# Patient Record
Sex: FEMALE | Race: BLACK OR AFRICAN AMERICAN | NOT HISPANIC OR LATINO | URBAN - METROPOLITAN AREA
[De-identification: names, ages, dates, MRNs, and addresses within clinical notes are randomized per-mention and may not be internally consistent; named-entity substitution may affect disease eponyms.]

---

## 2018-09-05 PROBLEM — Z00.00 ENCOUNTER FOR PREVENTIVE HEALTH EXAMINATION: Status: ACTIVE | Noted: 2018-09-05

## 2018-09-20 ENCOUNTER — OUTPATIENT (OUTPATIENT)
Dept: OUTPATIENT SERVICES | Facility: HOSPITAL | Age: 33
LOS: 1 days | End: 2018-09-20
Payer: COMMERCIAL

## 2018-09-20 ENCOUNTER — APPOINTMENT (OUTPATIENT)
Dept: RADIOLOGY | Facility: HOSPITAL | Age: 33
End: 2018-09-20
Payer: MEDICAID

## 2018-09-20 DIAGNOSIS — Z00.8 ENCOUNTER FOR OTHER GENERAL EXAMINATION: ICD-10-CM

## 2018-09-20 DIAGNOSIS — H53.8 OTHER VISUAL DISTURBANCES: ICD-10-CM

## 2018-09-20 LAB
APPEARANCE CSF: CLEAR — SIGNIFICANT CHANGE UP
APPEARANCE CSF: CLEAR — SIGNIFICANT CHANGE UP
APPEARANCE SPUN FLD: COLORLESS — SIGNIFICANT CHANGE UP
APPEARANCE SPUN FLD: COLORLESS — SIGNIFICANT CHANGE UP
COLOR CSF: SIGNIFICANT CHANGE UP
COLOR CSF: SIGNIFICANT CHANGE UP
GLUCOSE CSF-MCNC: 62 MG/DL — SIGNIFICANT CHANGE UP (ref 40–70)
GRAM STN FLD: SIGNIFICANT CHANGE UP
LYMPHOCYTES # CSF: 58 % — SIGNIFICANT CHANGE UP (ref 40–80)
LYMPHOCYTES # CSF: 83 % — HIGH (ref 40–80)
MONOS+MACROS NFR CSF: 17 % — SIGNIFICANT CHANGE UP (ref 15–45)
MONOS+MACROS NFR CSF: 35 % — SIGNIFICANT CHANGE UP (ref 15–45)
NEUTROPHILS # CSF: 0 % — SIGNIFICANT CHANGE UP (ref 0–6)
NEUTROPHILS # CSF: 7 % — HIGH (ref 0–6)
NRBC NFR CSF: 2 /UL — SIGNIFICANT CHANGE UP (ref 0–5)
NRBC NFR CSF: 3 /UL — SIGNIFICANT CHANGE UP (ref 0–5)
PROT CSF-MCNC: 19 MG/DL — SIGNIFICANT CHANGE UP (ref 15–45)
RBC # CSF: 310 /UL — HIGH (ref 0–0)
RBC # CSF: 39 /UL — HIGH (ref 0–0)
SPECIMEN SOURCE: SIGNIFICANT CHANGE UP
TUBE TYPE: SIGNIFICANT CHANGE UP
TUBE TYPE: SIGNIFICANT CHANGE UP

## 2018-09-20 PROCEDURE — 62270 DX LMBR SPI PNXR: CPT

## 2018-09-20 PROCEDURE — 84157 ASSAY OF PROTEIN OTHER: CPT

## 2018-09-20 PROCEDURE — 77003 FLUOROGUIDE FOR SPINE INJECT: CPT

## 2018-09-20 PROCEDURE — 82945 GLUCOSE OTHER FLUID: CPT

## 2018-09-20 PROCEDURE — 87070 CULTURE OTHR SPECIMN AEROBIC: CPT

## 2018-09-20 PROCEDURE — 87205 SMEAR GRAM STAIN: CPT

## 2018-09-20 PROCEDURE — 89051 BODY FLUID CELL COUNT: CPT

## 2018-09-20 PROCEDURE — 77003 FLUOROGUIDE FOR SPINE INJECT: CPT | Mod: 26

## 2018-09-23 LAB
CULTURE RESULTS: SIGNIFICANT CHANGE UP
SPECIMEN SOURCE: SIGNIFICANT CHANGE UP

## 2018-11-14 ENCOUNTER — APPOINTMENT (OUTPATIENT)
Dept: RADIOLOGY | Facility: HOSPITAL | Age: 33
End: 2018-11-14
Payer: MEDICAID

## 2018-11-14 ENCOUNTER — OUTPATIENT (OUTPATIENT)
Dept: OUTPATIENT SERVICES | Facility: HOSPITAL | Age: 33
LOS: 1 days | End: 2018-11-14
Payer: COMMERCIAL

## 2018-11-14 DIAGNOSIS — G93.2 BENIGN INTRACRANIAL HYPERTENSION: ICD-10-CM

## 2018-11-14 DIAGNOSIS — H05.10 UNSPECIFIED CHRONIC INFLAMMATORY DISORDERS OF ORBIT: ICD-10-CM

## 2018-11-14 DIAGNOSIS — Z00.8 ENCOUNTER FOR OTHER GENERAL EXAMINATION: ICD-10-CM

## 2018-11-14 PROCEDURE — 77003 FLUOROGUIDE FOR SPINE INJECT: CPT

## 2018-11-14 PROCEDURE — 77003 FLUOROGUIDE FOR SPINE INJECT: CPT | Mod: 26

## 2018-11-14 PROCEDURE — 89051 BODY FLUID CELL COUNT: CPT

## 2018-11-14 PROCEDURE — 87070 CULTURE OTHR SPECIMN AEROBIC: CPT

## 2018-11-14 PROCEDURE — 87205 SMEAR GRAM STAIN: CPT

## 2018-11-14 PROCEDURE — 62270 DX LMBR SPI PNXR: CPT

## 2018-11-14 PROCEDURE — 84157 ASSAY OF PROTEIN OTHER: CPT

## 2018-11-14 PROCEDURE — 82945 GLUCOSE OTHER FLUID: CPT

## 2018-11-26 ENCOUNTER — APPOINTMENT (OUTPATIENT)
Dept: SPINE | Facility: CLINIC | Age: 33
End: 2018-11-26
Payer: MEDICAID

## 2018-11-26 VITALS
HEIGHT: 61 IN | HEART RATE: 74 BPM | DIASTOLIC BLOOD PRESSURE: 77 MMHG | WEIGHT: 259 LBS | BODY MASS INDEX: 48.9 KG/M2 | SYSTOLIC BLOOD PRESSURE: 119 MMHG

## 2018-11-26 PROCEDURE — 99204 OFFICE O/P NEW MOD 45 MIN: CPT

## 2018-11-29 ENCOUNTER — APPOINTMENT (OUTPATIENT)
Dept: SPINE | Facility: CLINIC | Age: 33
End: 2018-11-29
Payer: MEDICAID

## 2018-11-29 VITALS
HEIGHT: 61 IN | DIASTOLIC BLOOD PRESSURE: 74 MMHG | BODY MASS INDEX: 48.9 KG/M2 | WEIGHT: 259 LBS | SYSTOLIC BLOOD PRESSURE: 113 MMHG | HEART RATE: 79 BPM

## 2018-11-29 PROCEDURE — 99214 OFFICE O/P EST MOD 30 MIN: CPT

## 2018-11-29 RX ORDER — ACETAMINOPHEN 500 MG/1
500 TABLET, COATED ORAL
Refills: 0 | Status: COMPLETED | COMMUNITY
End: 2018-11-29

## 2018-12-13 ENCOUNTER — APPOINTMENT (OUTPATIENT)
Dept: NEUROSURGERY | Facility: CLINIC | Age: 33
End: 2018-12-13
Payer: MEDICAID

## 2018-12-13 VITALS
WEIGHT: 258 LBS | DIASTOLIC BLOOD PRESSURE: 85 MMHG | BODY MASS INDEX: 48.71 KG/M2 | OXYGEN SATURATION: 99 % | HEIGHT: 61 IN | TEMPERATURE: 98.8 F | SYSTOLIC BLOOD PRESSURE: 123 MMHG | HEART RATE: 72 BPM | RESPIRATION RATE: 17 BRPM

## 2018-12-13 PROCEDURE — 99215 OFFICE O/P EST HI 40 MIN: CPT

## 2019-01-07 ENCOUNTER — OTHER (OUTPATIENT)
Age: 34
End: 2019-01-07

## 2019-01-14 ENCOUNTER — APPOINTMENT (OUTPATIENT)
Dept: SPINE | Facility: CLINIC | Age: 34
End: 2019-01-14

## 2019-01-30 ENCOUNTER — OUTPATIENT (OUTPATIENT)
Dept: OUTPATIENT SERVICES | Facility: HOSPITAL | Age: 34
LOS: 1 days | End: 2019-01-30
Payer: COMMERCIAL

## 2019-01-30 VITALS
SYSTOLIC BLOOD PRESSURE: 122 MMHG | HEIGHT: 61 IN | HEART RATE: 88 BPM | RESPIRATION RATE: 18 BRPM | DIASTOLIC BLOOD PRESSURE: 84 MMHG | OXYGEN SATURATION: 100 % | TEMPERATURE: 98 F | WEIGHT: 255.07 LBS

## 2019-01-30 DIAGNOSIS — Z78.9 OTHER SPECIFIED HEALTH STATUS: ICD-10-CM

## 2019-01-30 DIAGNOSIS — G93.2 BENIGN INTRACRANIAL HYPERTENSION: ICD-10-CM

## 2019-01-30 DIAGNOSIS — Z98.891 HISTORY OF UTERINE SCAR FROM PREVIOUS SURGERY: Chronic | ICD-10-CM

## 2019-01-30 DIAGNOSIS — Z98.1 ARTHRODESIS STATUS: Chronic | ICD-10-CM

## 2019-01-30 DIAGNOSIS — Z01.818 ENCOUNTER FOR OTHER PREPROCEDURAL EXAMINATION: ICD-10-CM

## 2019-01-30 LAB
ANION GAP SERPL CALC-SCNC: 10 MMOL/L — SIGNIFICANT CHANGE UP (ref 5–17)
BUN SERPL-MCNC: 16 MG/DL — SIGNIFICANT CHANGE UP (ref 7–23)
CALCIUM SERPL-MCNC: 9.5 MG/DL — SIGNIFICANT CHANGE UP (ref 8.4–10.5)
CHLORIDE SERPL-SCNC: 110 MMOL/L — HIGH (ref 96–108)
CO2 SERPL-SCNC: 21 MMOL/L — LOW (ref 22–31)
CREAT SERPL-MCNC: 0.72 MG/DL — SIGNIFICANT CHANGE UP (ref 0.5–1.3)
GLUCOSE SERPL-MCNC: 112 MG/DL — HIGH (ref 70–99)
HCT VFR BLD CALC: 41.1 % — SIGNIFICANT CHANGE UP (ref 34.5–45)
HGB BLD-MCNC: 13.1 G/DL — SIGNIFICANT CHANGE UP (ref 11.5–15.5)
MCHC RBC-ENTMCNC: 28.6 PG — SIGNIFICANT CHANGE UP (ref 27–34)
MCHC RBC-ENTMCNC: 31.9 GM/DL — LOW (ref 32–36)
MCV RBC AUTO: 89.7 FL — SIGNIFICANT CHANGE UP (ref 80–100)
PLATELET # BLD AUTO: 317 K/UL — SIGNIFICANT CHANGE UP (ref 150–400)
POTASSIUM SERPL-MCNC: 4.6 MMOL/L — SIGNIFICANT CHANGE UP (ref 3.5–5.3)
POTASSIUM SERPL-SCNC: 4.6 MMOL/L — SIGNIFICANT CHANGE UP (ref 3.5–5.3)
RBC # BLD: 4.58 M/UL — SIGNIFICANT CHANGE UP (ref 3.8–5.2)
RBC # FLD: 13.8 % — SIGNIFICANT CHANGE UP (ref 10.3–14.5)
SODIUM SERPL-SCNC: 141 MMOL/L — SIGNIFICANT CHANGE UP (ref 135–145)
WBC # BLD: 8.16 K/UL — SIGNIFICANT CHANGE UP (ref 3.8–10.5)
WBC # FLD AUTO: 8.16 K/UL — SIGNIFICANT CHANGE UP (ref 3.8–10.5)

## 2019-01-30 PROCEDURE — 80048 BASIC METABOLIC PNL TOTAL CA: CPT

## 2019-01-30 PROCEDURE — G0463: CPT

## 2019-01-30 PROCEDURE — 85027 COMPLETE CBC AUTOMATED: CPT

## 2019-01-30 NOTE — H&P PST ADULT - VISION (WITH CORRECTIVE LENSES IF THE PATIENT USUALLY WEARS THEM):
Due to current situuation/Partially impaired: cannot see medication labels or newsprint, but can see obstacles in path, and the surrounding layout; can count fingers at arm's length

## 2019-01-30 NOTE — H&P PST ADULT - PROBLEM SELECTOR PLAN 2
Pt states Dr. Onofre aware. Email sent to Dr. Onofre. Pt will not accept any products, only cell saver. Paperwork filled in chart.

## 2019-01-30 NOTE — H&P PST ADULT - HISTORY OF PRESENT ILLNESS
33yr old Jehovah Witness presents to PST for a Cerebral Angio w/ Venous Pressure Gradient Monitoring on 2/12/2019. Pt w/ blurriness x5 months, pseudotumor cerebri found on MRI. No other significant medical hx. Pt s/p Cervical 3-4 Fusion in 2016'. Denies chest pain or SOB and feels well otherwise.

## 2019-02-12 ENCOUNTER — OUTPATIENT (OUTPATIENT)
Dept: OUTPATIENT SERVICES | Facility: HOSPITAL | Age: 34
LOS: 1 days | End: 2019-02-12
Payer: COMMERCIAL

## 2019-02-12 ENCOUNTER — APPOINTMENT (OUTPATIENT)
Dept: NEUROSURGERY | Facility: CLINIC | Age: 34
End: 2019-02-12
Payer: MEDICAID

## 2019-02-12 DIAGNOSIS — Z01.818 ENCOUNTER FOR OTHER PREPROCEDURAL EXAMINATION: ICD-10-CM

## 2019-02-12 DIAGNOSIS — G93.2 BENIGN INTRACRANIAL HYPERTENSION: ICD-10-CM

## 2019-02-12 DIAGNOSIS — Z98.1 ARTHRODESIS STATUS: Chronic | ICD-10-CM

## 2019-02-12 DIAGNOSIS — Z98.891 HISTORY OF UTERINE SCAR FROM PREVIOUS SURGERY: Chronic | ICD-10-CM

## 2019-02-12 LAB
APTT BLD: 29.1 SEC — SIGNIFICANT CHANGE UP (ref 27.5–36.3)
INR BLD: 1.13 RATIO — SIGNIFICANT CHANGE UP (ref 0.88–1.16)
PROTHROM AB SERPL-ACNC: 13.1 SEC — HIGH (ref 10–12.9)

## 2019-02-12 PROCEDURE — 36226 PLACE CATH VERTEBRAL ART: CPT

## 2019-02-12 PROCEDURE — 75870 VEIN X-RAY SKULL: CPT | Mod: 26,59

## 2019-02-12 PROCEDURE — C1725: CPT

## 2019-02-12 PROCEDURE — 36224 PLACE CATH CAROTD ART: CPT | Mod: 50

## 2019-02-12 PROCEDURE — 37238 OPEN/PERQ PLACE STENT SAME: CPT

## 2019-02-12 PROCEDURE — 36012 PLACE CATHETER IN VEIN: CPT | Mod: 59

## 2019-02-12 PROCEDURE — 85730 THROMBOPLASTIN TIME PARTIAL: CPT

## 2019-02-12 PROCEDURE — C1887: CPT

## 2019-02-12 PROCEDURE — 75860 VEIN X-RAY NECK: CPT | Mod: 26,59

## 2019-02-12 PROCEDURE — C1894: CPT

## 2019-02-12 PROCEDURE — 85610 PROTHROMBIN TIME: CPT

## 2019-02-12 PROCEDURE — 36224 PLACE CATH CAROTD ART: CPT

## 2019-02-12 PROCEDURE — C1760: CPT

## 2019-02-12 PROCEDURE — 37248 TRLUML BALO ANGIOP 1ST VEIN: CPT

## 2019-02-12 PROCEDURE — 36226 PLACE CATH VERTEBRAL ART: CPT | Mod: RT

## 2019-02-12 PROCEDURE — C1876: CPT

## 2019-02-12 PROCEDURE — 61635 INTRACRAN ANGIOPLSTY W/STENT: CPT

## 2019-02-12 PROCEDURE — C1769: CPT

## 2019-02-12 RX ORDER — CLOPIDOGREL BISULFATE 75 MG/1
75 TABLET, FILM COATED ORAL DAILY
Qty: 0 | Refills: 0 | Status: DISCONTINUED | OUTPATIENT
Start: 2019-02-13 | End: 2019-02-27

## 2019-02-12 RX ORDER — CLOPIDOGREL BISULFATE 75 MG/1
600 TABLET, FILM COATED ORAL ONCE
Qty: 0 | Refills: 0 | Status: COMPLETED | OUTPATIENT
Start: 2019-02-12 | End: 2019-02-12

## 2019-02-12 RX ORDER — ASPIRIN/CALCIUM CARB/MAGNESIUM 324 MG
325 TABLET ORAL DAILY
Qty: 0 | Refills: 0 | Status: DISCONTINUED | OUTPATIENT
Start: 2019-02-12 | End: 2019-02-27

## 2019-02-12 RX ORDER — ACETAMINOPHEN 500 MG
1000 TABLET ORAL ONCE
Qty: 0 | Refills: 0 | Status: COMPLETED | OUTPATIENT
Start: 2019-02-12 | End: 2019-02-12

## 2019-02-12 RX ORDER — CLOPIDOGREL BISULFATE 75 MG/1
TABLET, FILM COATED ORAL
Qty: 0 | Refills: 0 | Status: DISCONTINUED | OUTPATIENT
Start: 2019-02-12 | End: 2019-02-27

## 2019-02-12 RX ORDER — SODIUM CHLORIDE 9 MG/ML
1000 INJECTION INTRAMUSCULAR; INTRAVENOUS; SUBCUTANEOUS
Qty: 0 | Refills: 0 | Status: DISCONTINUED | OUTPATIENT
Start: 2019-02-12 | End: 2019-02-27

## 2019-02-12 RX ADMIN — CLOPIDOGREL BISULFATE 600 MILLIGRAM(S): 75 TABLET, FILM COATED ORAL at 15:19

## 2019-02-12 RX ADMIN — Medication 400 MILLIGRAM(S): at 15:03

## 2019-02-12 RX ADMIN — Medication 1000 MILLIGRAM(S): at 15:26

## 2019-02-12 RX ADMIN — SODIUM CHLORIDE 60 MILLILITER(S): 9 INJECTION INTRAMUSCULAR; INTRAVENOUS; SUBCUTANEOUS at 15:02

## 2019-02-12 NOTE — CHART NOTE - NSCHARTNOTEFT_GEN_A_CORE
Interventional Neuro- Radiology   Procedure Note PA-C    Procedure: Selective Cerebral Angiography   Pre- Procedure Diagnosis:  pseudotumor cerebri  Post- Procedure Diagnosis:    : Dr Valeriy Onofre  [place]:   Dr Mary Perales  Physician Assistant: Aggie Singh PA-C        NP Jane Fragoso     Nurse:                       Calli Craft RN  Radiologic Tech:      Christopher D'Amico LRT  Anesthesiologist:      Dr Ravi Barnett    Sheath:                    5 Georgian     I/Os: EBL less than 10cc  IV fluids:     cc     Urine output   Contrast Omnipaque 240      cc           Vitals: BP         HR      Spo2        Preliminary Report:  Using a 5 Georgian sheath to the right groin under MAC sedation via left vertebral artery,  left common carotid artery, left external carotid artery, right vertebral artery, right common carotid artery, right external carotid artery a selective cerebral angiography was performed and  demonstrated   Patient tolerated procedure well, hemodynamically stable, no change in neurological status compared to baseline. Results discussed with neurosurgery, patient and patient's family. Right groin sheath was removed,  manual compression held to hemostasis  for  21 minutes, no active bleeding, no hematoma,+ peripheral pulses bilaterally, quick clot and safeguard balloon dressing applied at _____h. Interventional Neuro- Radiology   Procedure Note PA-C    Procedure: Selective Cerebral Angiography   Pre- Procedure Diagnosis:  pseudotumor cerebri  Post- Procedure Diagnosis:    : Dr Valeriy Onofre  [place]:   Dr Mary Perales  Physician Assistant: Aggie Singh PA-C        NP Jane Fragoso     Nurse:                       Calli Craft RN  Radiologic Tech:      Christopher D'Amico LRT  Anesthesiologist:      Dr Ravi Barnett    Sheath:                    5 Syriac     I/Os: EBL less than 10cc  IV fluids:     cc     Urine output   Contrast Omnipaque 240      cc           Vitals: BP         HR      Spo2        Preliminary Report:  Using a 5 Syriac sheath to the right groin under MAC sedation via left vertebral artery,  left common carotid artery, left external carotid artery, right vertebral artery, right common carotid artery, right external carotid artery a selective cerebral angiography was performed and  demonstrated   Patient tolerated procedure well, hemodynamically stable, no change in neurological status compared to baseline. Results discussed with neurosurgery, patient and patient's family. Right groin sheath was removed,  manual compression held to hemostasis  for  21 minutes, no active bleeding, no hematoma,+ peripheral pulses bilaterally, quick clot and safeguard balloon dressing applied at _____h. Interventional Neuro- Radiology   Procedure Note PA-C    Procedure: Selective Cerebral Angiography   Pre- Procedure Diagnosis:  pseudotumor cerebri  Post- Procedure Diagnosis:    : Dr Valeriy Onofre  [place]:   Dr Mary Perales  Physician Assistant: Aggie Singh PA-C        NP Jane Fragoso     Nurse:                       Calli Craft RN  Radiologic Tech:       Christopher D'Amico LRT  Anesthesiologist:      Dr Ravi Barnett    Sheath:                     5 Serbian     I/Os: EBL less than 10cc  IV fluids:     cc     Urine output   Contrast Omnipaque 240      cc           Vitals: BP         HR      Spo2        Preliminary Report:  Using a 5 Serbian sheath to the right groin under MAC sedation via left vertebral artery,  left common carotid artery, left external carotid artery, right vertebral artery, right common carotid artery, right external carotid artery a selective cerebral angiography was performed and  demonstrated   Patient tolerated procedure well, hemodynamically stable, no change in neurological status compared to baseline. Results discussed with neurosurgery, patient and patient's family. Right groin sheath was removed,  manual compression held to hemostasis  for  21 minutes, no active bleeding, no hematoma,+ peripheral pulses bilaterally, quick clot and safeguard balloon dressing applied at _____h. Interventional Neuro- Radiology   Procedure Note PA-C    Procedure: Selective Cerebral Angiography   Pre- Procedure Diagnosis:  pseudotumor cerebri  Post- Procedure Diagnosis:    : Dr Valeriy Onofre  [place]:   Dr Mary Perales  Physician Assistant: Aggie Singh PA-C        NP Jane Fragoso     Nurse:                       Calli Craft RN  Radiologic Tech:        Christopher D'Amico LRT  Anesthesiologist:        Dr Ravi Barnett    Sheath:                      5 Lithuanian     I/Os: EBL less than 10cc  IV fluids:     cc     Urine output   Contrast Omnipaque 240      cc           Vitals: BP         HR      Spo2        Preliminary Report:  Using a 5 Lithuanian sheath to the right groin under MAC sedation via left vertebral artery,  left common carotid artery, left external carotid artery, right vertebral artery, right common carotid artery, right external carotid artery a selective cerebral angiography was performed and  demonstrated   Patient tolerated procedure well, hemodynamically stable, no change in neurological status compared to baseline. Results discussed with neurosurgery, patient and patient's family. Right groin sheath was removed,  manual compression held to hemostasis  for  21 minutes, no active bleeding, no hematoma,+ peripheral pulses bilaterally, quick clot and safeguard balloon dressing applied at _____h. Interventional Neuro- Radiology   Procedure Note PA-C    Procedure: Selective Cerebral Angiography   Pre- Procedure Diagnosis:  pseudotumor cerebri  Post- Procedure Diagnosis:    : Dr Valeriy Onofre  [place]:   Dr Mary Perales  Physician Assistant: Aggie Singh PA-C        NP Jane Fragoso     Nurse:                        Calli Craft RN  Radiologic Tech:        Christopher D'Amico LRT  Anesthesiologist:        Dr Ravi Barnett    Sheath:                      5 Cypriot     I/Os: EBL less than 10cc  IV fluids:     cc     Urine output   Contrast Omnipaque 240      cc           Vitals: BP         HR      Spo2        Preliminary Report:  Using a 5 Cypriot sheath to the right groin under MAC sedation via left vertebral artery,  left common carotid artery, left external carotid artery, right vertebral artery, right common carotid artery, right external carotid artery a selective cerebral angiography was performed and  demonstrated   Patient tolerated procedure well, hemodynamically stable, no change in neurological status compared to baseline. Results discussed with neurosurgery, patient and patient's family. Right groin sheath was removed,  manual compression held to hemostasis  for  21 minutes, no active bleeding, no hematoma,+ peripheral pulses bilaterally, quick clot and safeguard balloon dressing applied at _____h. Interventional Neuro- Radiology   Procedure Note PA-C    Procedure: Selective Cerebral Angiography, angioplasty and intracranial stent placement   Pre- Procedure Diagnosis:  pseudotumor cerebri  Post- Procedure Diagnosis:    : Dr Valeriy Onofre  [place]:   Dr Mary Perales  Physician Assistant: Aggie Singh PA-C        NP Jane Fragoso     Nurse:                        Calli Craft RN  Radiologic Tech:        Christopher D'Amico LRT  Anesthesiologist:        Dr Ravi Barnett    Sheath:                      5 Kyrgyz arrow 65cm in right femoral artery    4 Kyrgyz Fubuki     I/Os: EBL less than 10cc  IV fluids:400cc Urine output due to void  Contrast Omnipaque 240 178           and            Heparin 5,000 units           2nd      Vitals: /72         HR 84     Spo2 100%      Preliminary Report:  Using a 5 Kyrgyz sheath to the right groin under MAC sedation via left internal carotid artery, right vertebral artery, right internal carotid artery, a selective cerebral angiography was performed and demonstrated intracranial stenosis. Zilver intracranial stent placed. Official note to follow.   Patient tolerated procedure well, hemodynamically stable, no change in neurological status compared to baseline. Results discussed with neurosurgery, patient and patient's family. Right arterial groin sheath was removed, star close device and manual compression held to hemostasis for 20 minutes. no active bleeding, no hematoma,+ peripheral pulses bilaterally dressing applied at 1230pm.  Right femoral vein 5 Kyrgyz long sheath sutured in place. Please use heparinized saline.   PTT, PT, INR at 1630 hours. Interventional Neuro- Radiology   Procedure Note PA-C    Procedure: Selective Cerebral Angiography, angioplasty and right transverse sinus stent placement   Pre- Procedure Diagnosis:  pseudotumor cerebri  Post- Procedure Diagnosis: pseudotumor cerebri s/post right transverse sinus stent placement     : Dr Valeriy Onofre  [place]:   Dr Mary Perales  Physician Assistant: Aggie Singh PA-C        NP Jane Fragoso     Nurse:                        Calli Craft RN  Radiologic Tech:        Christopher D'Amico LRT  Anesthesiologist:        Dr Ravi Barnett    Sheath:                      5 Stateless arrow 65cm in right femoral artery    4 Stateless Fubuki     I/Os: EBL less than 10cc  IV fluids:400cc Urine output due to void  Contrast Omnipaque 240 178           and            Heparin 5,000 units           2nd      Vitals: /72         HR 84     Spo2 100%      Preliminary Report:  Using a 5 Stateless sheath to the right groin under MAC sedation via left internal carotid artery, right vertebral artery, right internal carotid artery, a selective cerebral angiography was performed and demonstrated intracranial stenosis. A Zilver intracranial stent placed in the right transverse sinus. Official note to follow.   Patient tolerated procedure well, hemodynamically stable, no change in neurological status compared to baseline. Results discussed with neurosurgery, patient and patient's family. Right arterial groin sheath was removed, star close device and manual compression held to hemostasis for 20 minutes. no active bleeding, no hematoma,+ peripheral pulses bilaterally dressing applied at 1230pm.  Right femoral vein 5 Stateless long sheath sutured in place. Please use heparinized saline. PTT, PT, INR at 1630 hours.

## 2019-02-12 NOTE — CHART NOTE - NSCHARTNOTEFT_GEN_A_CORE
Interventional Neuro Radiology  Pre-Procedure Note NP    HPI: This is a 33yr old right handed female who presents for work up for pseudotumor cerebri.     Allergies: No Known Allergies    PAST MEDICAL & SURGICAL HISTORY:  Patient is Voodoo  Headache  Benign intracranial hypertension  Blurry vision  S/P : &#x27;  S/P cervical spinal fusion: &#x27;    Social History: Non- smoker    FAMILY HISTORY:  No pertinent family history in first degree relatives    Assessment/Plan:     This is a 33y  year old right  hand dominant Female presents to neuro-IR for selective cerebral angiography with venous pressure gradient monitoring  Procedure, goals, risks, benefits and alternatives  were discussed with patient and patient's family.  All questions were answered.  Risks include but are not limited to stroke, vessel injury, hemorrhage, and or right  groin hematoma.  Patient demonstrates understanding  of all risks involved with this procedure and wishes to continue.   Appropriate  consent was obtained from patient and consent is in the patient's chart.

## 2019-03-15 PROBLEM — R51 HEADACHE: Chronic | Status: ACTIVE | Noted: 2019-01-30

## 2019-03-15 PROBLEM — G93.2 BENIGN INTRACRANIAL HYPERTENSION: Chronic | Status: ACTIVE | Noted: 2019-01-30

## 2019-03-15 PROBLEM — Z78.9 OTHER SPECIFIED HEALTH STATUS: Chronic | Status: ACTIVE | Noted: 2019-01-30

## 2019-03-20 ENCOUNTER — OUTPATIENT (OUTPATIENT)
Dept: OUTPATIENT SERVICES | Facility: HOSPITAL | Age: 34
LOS: 1 days | End: 2019-03-20
Payer: MEDICARE

## 2019-03-20 ENCOUNTER — APPOINTMENT (OUTPATIENT)
Dept: RADIOLOGY | Facility: HOSPITAL | Age: 34
End: 2019-03-20
Payer: MEDICAID

## 2019-03-20 DIAGNOSIS — Z98.891 HISTORY OF UTERINE SCAR FROM PREVIOUS SURGERY: Chronic | ICD-10-CM

## 2019-03-20 DIAGNOSIS — G93.0 CEREBRAL CYSTS: ICD-10-CM

## 2019-03-20 DIAGNOSIS — Z98.1 ARTHRODESIS STATUS: Chronic | ICD-10-CM

## 2019-03-20 DIAGNOSIS — G93.2 BENIGN INTRACRANIAL HYPERTENSION: ICD-10-CM

## 2019-03-20 DIAGNOSIS — Z00.00 ENCOUNTER FOR GENERAL ADULT MEDICAL EXAMINATION WITHOUT ABNORMAL FINDINGS: ICD-10-CM

## 2019-03-20 PROCEDURE — 77003 FLUOROGUIDE FOR SPINE INJECT: CPT | Mod: 26

## 2019-03-20 PROCEDURE — 62270 DX LMBR SPI PNXR: CPT

## 2019-03-20 PROCEDURE — 77003 FLUOROGUIDE FOR SPINE INJECT: CPT

## 2019-04-03 ENCOUNTER — APPOINTMENT (OUTPATIENT)
Dept: NEUROSURGERY | Facility: CLINIC | Age: 34
End: 2019-04-03
Payer: COMMERCIAL

## 2019-04-03 VITALS
BODY MASS INDEX: 48.71 KG/M2 | SYSTOLIC BLOOD PRESSURE: 123 MMHG | TEMPERATURE: 98.9 F | HEIGHT: 61 IN | OXYGEN SATURATION: 98 % | RESPIRATION RATE: 18 BRPM | WEIGHT: 258 LBS | DIASTOLIC BLOOD PRESSURE: 81 MMHG | HEART RATE: 81 BPM

## 2019-04-03 PROCEDURE — 99214 OFFICE O/P EST MOD 30 MIN: CPT

## 2019-04-03 NOTE — DATA REVIEWED
[de-identified] : cerebral angiogram with angioplasty and stenting of left transverse sinus done 2/12/2019

## 2019-04-03 NOTE — REASON FOR VISIT
[Follow-Up: _____] : a [unfilled] follow-up visit [FreeTextEntry1] : Sima Weaver is a 34yr old female here for a  follow up visit after having cerebral angiography with angioplasty and stenting of left transverse sigmoid sinus done 2/12/2019. She tolerated the procedure well, recovered in the PACU, and was discharged home in stable condition. Since she has been home she has followed up with her opthomologist Dr. Benítez who said her vision is slightly better but may not improve  more. She is unsure what her occular pressure was most recently but she remains on the diamox medication. She recently went for an LP and it was noted the opening pressure was 29, she had 27cc of csf removed and the pressure was down to 4. \par \par HPI: Sima Weaver is a 33yr old female here today for a new patient visit. She started having blurry vision and saw Dr. Benítez, an opthomoligist. She underwent imaging with MRI and MRV as work up for pseudo tumor cerebri. At her initial opthalmology visit she had papillary edema. She was started on diamox and at her follow up evaluation her papillary edema improved. She also had two spinal taps one with an opening pressure of 36 and the second with an opening pressure of 33. Despite the improvement in the papillary edema from the diamox she still has blurry vision. She went to Dr. Dunn to discuss treatment of the pseudo tumor cerebri with a lumbar peritoneal shunt. He referred her to us for further work up before potential treatment with a shunt.

## 2019-04-03 NOTE — REVIEW OF SYSTEMS
[As Noted in HPI] : as noted in HPI [Eyesight Problems] : eyesight problems [Negative] : Heme/Lymph [FreeTextEntry9] : low back pain

## 2019-04-03 NOTE — PHYSICAL EXAM
[General Appearance - Alert] : alert [General Appearance - In No Acute Distress] : in no acute distress [Oriented To Time, Place, And Person] : oriented to person, place, and time [Person] : oriented to person [Place] : oriented to place [Time] : oriented to time [Motor Strength] : muscle strength was normal in all four extremities [Involuntary Movements] : no involuntary movements were seen [] : no respiratory distress [Abnormal Walk] : normal gait

## 2019-04-03 NOTE — ASSESSMENT
[FreeTextEntry1] : Impression: 33yr old female with pseudo tumor cerebri\par \par Plan: \par Reviewed the results of patients cerebral angiogram done 2/12/2019 which showed a pressure gradient in her sinus which we proceeded to treat with angioplasty and stenting of left transverse sigmoid junction\par Continue to follow up with opthomology Dr. Benítez \par Follow up with Dr. Dunn to evaluate whether she is a candidate for LP shunt after having her most recent LP done 3/20/2019

## 2019-04-22 ENCOUNTER — APPOINTMENT (OUTPATIENT)
Dept: SPINE | Facility: CLINIC | Age: 34
End: 2019-04-22
Payer: MEDICARE

## 2019-04-22 VITALS — DIASTOLIC BLOOD PRESSURE: 80 MMHG | SYSTOLIC BLOOD PRESSURE: 120 MMHG

## 2019-04-22 PROCEDURE — 99214 OFFICE O/P EST MOD 30 MIN: CPT

## 2019-04-23 VITALS — WEIGHT: 258 LBS | BODY MASS INDEX: 48.71 KG/M2 | HEIGHT: 61 IN

## 2019-04-23 NOTE — REASON FOR VISIT
[Follow-Up: _____] : a [unfilled] follow-up visit [Spouse] : spouse [FreeTextEntry1] : PTC with worsening vision

## 2019-04-23 NOTE — ASSESSMENT
[FreeTextEntry1] : The patient is a 34-year-old female with pseudotumor cerebri. As the patient remains concerned about her vision and has diagnostic evidence of increased intracranial pressure, I believe that the patient may benefit from insertion of a ventriculoperitoneal shunt placement with stereotactic guidance and laparoscopic assistance. The risks, benefits, and alternatives to surgery were discussed with the patient, who expressed understanding, and wishes to proceed with surgery. The patient should obtain a stereotactic CT of the head without contrast prior to surgery for surgical planning.

## 2019-04-23 NOTE — REVIEW OF SYSTEMS
[Migraine Headache] : migraine headaches [As Noted in HPI] : as noted in HPI [Negative] : Endocrine [FreeTextEntry3] : blurry vision

## 2019-04-23 NOTE — PHYSICAL EXAM
[General Appearance - In No Acute Distress] : in no acute distress [General Appearance - Alert] : alert [Oriented To Time, Place, And Person] : oriented to person, place, and time [Impaired Insight] : insight and judgment were intact [Affect] : the affect was normal [Place] : oriented to place [Person] : oriented to person [Time] : oriented to time [Short Term Intact] : short term memory intact [Remote Intact] : remote memory intact [Span Intact] : the attention span was normal [Concentration Intact] : normal concentrating ability [Fluency] : fluency intact [Comprehension] : comprehension intact [Current Events] : adequate knowledge of current events [Past History] : adequate knowledge of personal past history [Cranial Nerves Optic (II)] : visual acuity intact bilaterally,  pupils equal round and reactive to light [Vocabulary] : adequate range of vocabulary [Cranial Nerves Trigeminal (V)] : facial sensation intact symmetrically [Cranial Nerves Oculomotor (III)] : extraocular motion intact [Cranial Nerves Facial (VII)] : face symmetrical [Cranial Nerves Glossopharyngeal (IX)] : tongue and palate midline [Cranial Nerves Vestibulocochlear (VIII)] : hearing was intact bilaterally [Cranial Nerves Accessory (XI - Cranial And Spinal)] : head turning and shoulder shrug symmetric [Cranial Nerves Hypoglossal (XII)] : there was no tongue deviation with protrusion [No Muscle Atrophy] : normal bulk in all four extremities [Motor Strength] : muscle strength was normal in all four extremities [Sensation Tactile Decrease] : light touch was intact [Balance] : balance was intact [2+] : Patella left 2+ [No Visual Abnormalities] : no visible abnormailities [No Tenderness to Palpation] : no spine tenderness on palpation [Full ROM] : full ROM [No Pain with ROM] : no pain with motion in any direction [Normal] : normal [Intact] : all reflexes within normal limits bilaterally [Sclera] : the sclera and conjunctiva were normal [PERRL With Normal Accommodation] : pupils were equal in size, round, reactive to light, with normal accommodation [Extraocular Movements] : extraocular movements were intact [Oropharynx] : the oropharynx was normal [Outer Ear] : the ears and nose were normal in appearance [Jugular Venous Distention Increased] : there was no jugular-venous distention [Neck Cervical Mass (___cm)] : no neck mass was observed [Neck Appearance] : the appearance of the neck was normal [Thyroid Diffuse Enlargement] : the thyroid was not enlarged [Thyroid Nodule] : there were no palpable thyroid nodules [Heart Rate And Rhythm] : heart rate was normal and rhythm regular [Auscultation Breath Sounds / Voice Sounds] : lungs were clear to auscultation bilaterally [Heart Sounds] : normal S1 and S2 [Murmurs] : no murmurs [Heart Sounds Gallop] : no gallops [Heart Sounds Pericardial Friction Rub] : no pericardial rub [Full Pulse] : the pedal pulses are present [Bowel Sounds] : normal bowel sounds [Edema] : there was no peripheral edema [Abdomen Tenderness] : non-tender [Abdomen Soft] : soft [Abdomen Mass (___ Cm)] : no abdominal mass palpated [No Spinal Tenderness] : no spinal tenderness [No CVA Tenderness] : no ~M costovertebral angle tenderness [Abnormal Walk] : normal gait [Musculoskeletal - Swelling] : no joint swelling seen [Nail Clubbing] : no clubbing  or cyanosis of the fingernails [Skin Color & Pigmentation] : normal skin color and pigmentation [Motor Tone] : muscle strength and tone were normal [Skin Turgor] : normal skin turgor [] : no rash [Past-pointing] : there was no past-pointing [Tremor] : no tremor present [L'Hermitte's] : neck flexion did not produce tingling down the spine/arms [Spurling's - Opposite Side] : Negative Spurling's on opposite side [Spurling's Same Side] : Negative Spurling's on same side [Straight-Leg Raise Test - Right] : straight leg raise  of the right leg was negative [Straight-Leg Raise Test - Left] : straight leg raise of the left leg was negative

## 2019-04-23 NOTE — HISTORY OF PRESENT ILLNESS
[FreeTextEntry1] : I had the pleasure of seeing Ms. Sima Weaver for a follow-up visit to my office today. Ms. Weaver is a pleasant 33-year-old female who had been suffering from left-sided headaches since March of 2018. The patient's headaches became associated with blurry vision in August. The patient underwent two spinal taps, with an opening pressure of 36 and 33. The patient was diagnosed with pseudotumor cerebri and placed on Diamox. Since the spinal taps and being placed on Diamox, the patient noted improvement in her headaches but not her blurry vision. However, an evaluation with her Neuro-Ophthalmologist revealed improvement in her papilledema. 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 Valeriy Oonfre, and underwent a cerebral angiogram, angioplasty, and stenting on 2/12/19. Postoperatively, the patient notes further improvement in her headaches but not her blurry vision. A third LP reveals an opening pressure of 29.

## 2019-04-30 ENCOUNTER — OUTPATIENT (OUTPATIENT)
Dept: OUTPATIENT SERVICES | Facility: HOSPITAL | Age: 34
LOS: 1 days | End: 2019-04-30
Payer: MEDICARE

## 2019-04-30 VITALS
OXYGEN SATURATION: 99 % | HEART RATE: 65 BPM | HEIGHT: 61 IN | WEIGHT: 253.97 LBS | TEMPERATURE: 98 F | RESPIRATION RATE: 14 BRPM | DIASTOLIC BLOOD PRESSURE: 82 MMHG | SYSTOLIC BLOOD PRESSURE: 120 MMHG

## 2019-04-30 DIAGNOSIS — Z29.9 ENCOUNTER FOR PROPHYLACTIC MEASURES, UNSPECIFIED: ICD-10-CM

## 2019-04-30 DIAGNOSIS — Z98.891 HISTORY OF UTERINE SCAR FROM PREVIOUS SURGERY: Chronic | ICD-10-CM

## 2019-04-30 DIAGNOSIS — Q32.2 CONGENITAL BRONCHOMALACIA: ICD-10-CM

## 2019-04-30 DIAGNOSIS — Z01.818 ENCOUNTER FOR OTHER PREPROCEDURAL EXAMINATION: ICD-10-CM

## 2019-04-30 DIAGNOSIS — G93.2 BENIGN INTRACRANIAL HYPERTENSION: ICD-10-CM

## 2019-04-30 DIAGNOSIS — Z98.1 ARTHRODESIS STATUS: Chronic | ICD-10-CM

## 2019-04-30 LAB
ANION GAP SERPL CALC-SCNC: 14 MMOL/L — SIGNIFICANT CHANGE UP (ref 5–17)
BUN SERPL-MCNC: 11 MG/DL — SIGNIFICANT CHANGE UP (ref 7–23)
CALCIUM SERPL-MCNC: 9.8 MG/DL — SIGNIFICANT CHANGE UP (ref 8.4–10.5)
CHLORIDE SERPL-SCNC: 108 MMOL/L — SIGNIFICANT CHANGE UP (ref 96–108)
CO2 SERPL-SCNC: 19 MMOL/L — LOW (ref 22–31)
CREAT SERPL-MCNC: 0.7 MG/DL — SIGNIFICANT CHANGE UP (ref 0.5–1.3)
GLUCOSE SERPL-MCNC: 83 MG/DL — SIGNIFICANT CHANGE UP (ref 70–99)
HCT VFR BLD CALC: 40.7 % — SIGNIFICANT CHANGE UP (ref 34.5–45)
HGB BLD-MCNC: 12.8 G/DL — SIGNIFICANT CHANGE UP (ref 11.5–15.5)
MCHC RBC-ENTMCNC: 28.1 PG — SIGNIFICANT CHANGE UP (ref 27–34)
MCHC RBC-ENTMCNC: 31.4 GM/DL — LOW (ref 32–36)
MCV RBC AUTO: 89.5 FL — SIGNIFICANT CHANGE UP (ref 80–100)
MRSA PCR RESULT.: SIGNIFICANT CHANGE UP
PLATELET # BLD AUTO: 288 K/UL — SIGNIFICANT CHANGE UP (ref 150–400)
POTASSIUM SERPL-MCNC: 4.3 MMOL/L — SIGNIFICANT CHANGE UP (ref 3.5–5.3)
POTASSIUM SERPL-SCNC: 4.3 MMOL/L — SIGNIFICANT CHANGE UP (ref 3.5–5.3)
RBC # BLD: 4.55 M/UL — SIGNIFICANT CHANGE UP (ref 3.8–5.2)
RBC # FLD: 13.8 % — SIGNIFICANT CHANGE UP (ref 10.3–14.5)
S AUREUS DNA NOSE QL NAA+PROBE: SIGNIFICANT CHANGE UP
SODIUM SERPL-SCNC: 141 MMOL/L — SIGNIFICANT CHANGE UP (ref 135–145)
WBC # BLD: 6.79 K/UL — SIGNIFICANT CHANGE UP (ref 3.8–10.5)
WBC # FLD AUTO: 6.79 K/UL — SIGNIFICANT CHANGE UP (ref 3.8–10.5)

## 2019-04-30 PROCEDURE — 80048 BASIC METABOLIC PNL TOTAL CA: CPT

## 2019-04-30 PROCEDURE — 87640 STAPH A DNA AMP PROBE: CPT

## 2019-04-30 PROCEDURE — G0463: CPT

## 2019-04-30 PROCEDURE — 85027 COMPLETE CBC AUTOMATED: CPT

## 2019-04-30 PROCEDURE — 87641 MR-STAPH DNA AMP PROBE: CPT

## 2019-04-30 RX ORDER — CEFAZOLIN SODIUM 1 G
2000 VIAL (EA) INJECTION ONCE
Qty: 0 | Refills: 0 | Status: DISCONTINUED | OUTPATIENT
Start: 2019-05-07 | End: 2019-05-09

## 2019-04-30 NOTE — H&P PST ADULT - HISTORY OF PRESENT ILLNESS
33yr old Jehovah Witness presents to PST for a Cerebral Angio w/ Venous Pressure Gradient Monitoring on 2/12/2019. Pt w/ blurriness x5 months, pseudotumor cerebri found on MRI. No other significant medical hx. Pt s/p Cervical 3-4 Fusion in 2016'. Denies chest pain or SOB and feels well otherwise. 35 y/o obese female, Jehovah Witness, w/ pseudotumor cerebri c/o blurry vision, headaches, gait disturbances - feels "off-balance", and forgetfulness for several months; s/p cerebral angio w/ stenting in 2/2019 w/ some improvement of her HAs but her vision is the same. Presents now for VPS insertion.

## 2019-04-30 NOTE — H&P PST ADULT - NSICDXPASTMEDICALHX_GEN_ALL_CORE_FT
PAST MEDICAL HISTORY:  Benign intracranial hypertension     Headache     Patient is Cheondoism PAST MEDICAL HISTORY:  Benign intracranial hypertension     Cervical spine pain s/p fusion;  still has pain to b/l shoulders and tingling to her right arm    Headache     Patient is Sikhism PAST MEDICAL HISTORY:  Benign intracranial hypertension     Cervical spine pain s/p fusion;  still has pain to b/l shoulders and tingling to her right arm    Headache     Patient is Rastafarian

## 2019-04-30 NOTE — H&P PST ADULT - NSICDXPROBLEM_GEN_ALL_CORE_FT
PROBLEM DIAGNOSES  Problem: Benign intracranial hypertension  Assessment and Plan: Scheduled VPS insertion  - Nasal swab collected  - Pt is a Jehovah Witness - Blood Avoidance consent form reviewed with pt. She will fill form out at home and will bring in the DOS  - BMI > 40, placed on bariatric list   - c/w Diamox as prescribed   - urine preg DOS PROBLEM DIAGNOSES  Problem: Benign intracranial hypertension  Assessment and Plan: Scheduled VPS insertion  - Nasal swab collected  - Pt is a Jehovah Witness - Blood Avoidance consent form reviewed with pt. She will fill form out at home and will bring in the DOS  - BMI > 40, placed on bariatric list   - c/w Diamox as prescribed   - urine preg DOS       Problem: Need for prophylactic measure  Assessment and Plan: The Caprini score indicates this patient is at risk for a VTE event (score 3-5).  Most surgical patients in this group would benefit from pharmacologic prophylaxis.  The surgical team will determine the balance between VTE risk and bleeding risk

## 2019-04-30 NOTE — H&P PST ADULT - VISION (WITH CORRECTIVE LENSES IF THE PATIENT USUALLY WEARS THEM):
Due to current situuation/Partially impaired: cannot see medication labels or newsprint, but can see obstacles in path, and the surrounding layout; can count fingers at arm's length Partially impaired: cannot see medication labels or newsprint, but can see obstacles in path, and the surrounding layout; can count fingers at arm's length

## 2019-04-30 NOTE — H&P PST ADULT - ASSESSMENT
CAPRINI SCORE [CLOT updated 18]    AGE RELATED RISK FACTORS                                                       MOBILITY RELATED FACTORS  [ ] Age 41-60 years                                            (1 Point)                    [ ] Bed rest                                                        (1 Point)  [ ] Age: 61-74 years                                           (2 Points)                  [ ] Plaster cast                                                   (2 Points)  [ ] Age= 75 years                                              (3 Points)                    [ ] Bed bound for more than 72 hours                 (2 Points)    DISEASE RELATED RISK FACTORS                                               GENDER SPECIFIC FACTORS  [ ] Edema in the lower extremities                       (1 Point)              [ ] Pregnancy                                                     (1 Point)  [ ] Varicose veins                                               (1 Point)                     [ ] Post-partum < 6 weeks                                   (1 Point)             [ ] BMI > 25 Kg/m2                                            (1 Point)                     [ ] Hormonal therapy  or oral contraception          (1 Point)                 [ ] Sepsis (in the previous month)                        (1 Point)               [ ] History of pregnancy complications                 (1 point)  [ ] Pneumonia or serious lung disease                                               [ ] Unexplained or recurrent                     (1 Point)           (in the previous month)                               (1 Point)  [ ] Abnormal pulmonary function test                     (1 Point)                 SURGERY RELATED RISK FACTORS  [ ] Acute myocardial infarction                              (1 Point)               [ ]  Section                                             (1 Point)  [ ] Congestive heart failure (in the previous month)  (1 Point)      [ ] Minor surgery                                                  (1 Point)   [ ] Inflammatory bowel disease                             (1 Point)               [ ] Arthroscopic surgery                                        (2 Points)  [ ] Central venous access                                      (2 Points)                [ ] General surgery lasting more than 45 minutes (2 points)  [ ] Present or previous malignancy                     (2 Points)                [ ] Elective arthroplasty                                         (5 points)    [ ] Stroke (in the previous month)                          (5 Points)                                                                                                                                                           HEMATOLOGY RELATED FACTORS                                                 TRAUMA RELATED RISK FACTORS  [ ] Prior episodes of VTE                                     (3 Points)                [ ] Fracture of the hip, pelvis, or leg                       (5 Points)  [ ] Positive family history for VTE                         (3 Points)             [ ] Acute spinal cord injury (in the previous month)  (5 Points)  [ ] Prothrombin 17984 A                                     (3 Points)               [ ] Paralysis  (less than 1 month)                             (5 Points)  [ ] Factor V Leiden                                             (3 Points)                  [ ] Multiple Trauma within 1 month                        (5 Points)  [ ] Lupus anticoagulants                                     (3 Points)                                                           [ ] Anticardiolipin antibodies                               (3 Points)                                                       [ ] High homocysteine in the blood                      (3 Points)                                             [ ] Other congenital or acquired thrombophilia      (3 Points)                                                [ ] Heparin induced thrombocytopenia                  (3 Points)                                     Total Score [    4      ]

## 2019-05-01 PROBLEM — M54.2 CERVICALGIA: Chronic | Status: ACTIVE | Noted: 2019-04-30

## 2019-05-06 ENCOUNTER — TRANSCRIPTION ENCOUNTER (OUTPATIENT)
Age: 34
End: 2019-05-06

## 2019-05-07 ENCOUNTER — APPOINTMENT (OUTPATIENT)
Dept: CT IMAGING | Facility: HOSPITAL | Age: 34
End: 2019-05-07

## 2019-05-07 ENCOUNTER — INPATIENT (INPATIENT)
Facility: HOSPITAL | Age: 34
LOS: 1 days | Discharge: HOME | DRG: 32 | End: 2019-05-09
Attending: NEUROLOGICAL SURGERY | Admitting: NEUROLOGICAL SURGERY
Payer: MEDICARE

## 2019-05-07 ENCOUNTER — APPOINTMENT (OUTPATIENT)
Dept: SPINE | Facility: HOSPITAL | Age: 34
End: 2019-05-07

## 2019-05-07 ENCOUNTER — APPOINTMENT (OUTPATIENT)
Dept: SURGERY | Facility: HOSPITAL | Age: 34
End: 2019-05-07
Payer: COMMERCIAL

## 2019-05-07 VITALS
DIASTOLIC BLOOD PRESSURE: 72 MMHG | WEIGHT: 253.97 LBS | RESPIRATION RATE: 18 BRPM | SYSTOLIC BLOOD PRESSURE: 109 MMHG | HEIGHT: 61 IN | OXYGEN SATURATION: 97 % | HEART RATE: 80 BPM | TEMPERATURE: 98 F

## 2019-05-07 DIAGNOSIS — Z98.891 HISTORY OF UTERINE SCAR FROM PREVIOUS SURGERY: Chronic | ICD-10-CM

## 2019-05-07 DIAGNOSIS — Q32.2 CONGENITAL BRONCHOMALACIA: ICD-10-CM

## 2019-05-07 DIAGNOSIS — Z98.1 ARTHRODESIS STATUS: Chronic | ICD-10-CM

## 2019-05-07 LAB
ALBUMIN SERPL ELPH-MCNC: 4.2 G/DL — SIGNIFICANT CHANGE UP (ref 3.3–5)
ALP SERPL-CCNC: 79 U/L — SIGNIFICANT CHANGE UP (ref 40–120)
ALT FLD-CCNC: 7 U/L — LOW (ref 10–45)
ANION GAP SERPL CALC-SCNC: 10 MMOL/L — SIGNIFICANT CHANGE UP (ref 5–17)
AST SERPL-CCNC: 12 U/L — SIGNIFICANT CHANGE UP (ref 10–40)
BASOPHILS # BLD AUTO: 0.1 K/UL — SIGNIFICANT CHANGE UP (ref 0–0.2)
BASOPHILS NFR BLD AUTO: 1.1 % — SIGNIFICANT CHANGE UP (ref 0–2)
BILIRUB SERPL-MCNC: 0.1 MG/DL — LOW (ref 0.2–1.2)
BUN SERPL-MCNC: 13 MG/DL — SIGNIFICANT CHANGE UP (ref 7–23)
CALCIUM SERPL-MCNC: 9 MG/DL — SIGNIFICANT CHANGE UP (ref 8.4–10.5)
CHLORIDE SERPL-SCNC: 107 MMOL/L — SIGNIFICANT CHANGE UP (ref 96–108)
CO2 SERPL-SCNC: 21 MMOL/L — LOW (ref 22–31)
CREAT SERPL-MCNC: 0.82 MG/DL — SIGNIFICANT CHANGE UP (ref 0.5–1.3)
EOSINOPHIL # BLD AUTO: 0 K/UL — SIGNIFICANT CHANGE UP (ref 0–0.5)
EOSINOPHIL NFR BLD AUTO: 0.5 % — SIGNIFICANT CHANGE UP (ref 0–6)
GLUCOSE SERPL-MCNC: 111 MG/DL — HIGH (ref 70–99)
HCG UR QL: NEGATIVE — SIGNIFICANT CHANGE UP
HCT VFR BLD CALC: 38.7 % — SIGNIFICANT CHANGE UP (ref 34.5–45)
HGB BLD-MCNC: 12.9 G/DL — SIGNIFICANT CHANGE UP (ref 11.5–15.5)
LYMPHOCYTES # BLD AUTO: 1.7 K/UL — SIGNIFICANT CHANGE UP (ref 1–3.3)
LYMPHOCYTES # BLD AUTO: 18.6 % — SIGNIFICANT CHANGE UP (ref 13–44)
MCHC RBC-ENTMCNC: 29.4 PG — SIGNIFICANT CHANGE UP (ref 27–34)
MCHC RBC-ENTMCNC: 33.2 GM/DL — SIGNIFICANT CHANGE UP (ref 32–36)
MCV RBC AUTO: 88.5 FL — SIGNIFICANT CHANGE UP (ref 80–100)
MONOCYTES # BLD AUTO: 0.4 K/UL — SIGNIFICANT CHANGE UP (ref 0–0.9)
MONOCYTES NFR BLD AUTO: 4.3 % — SIGNIFICANT CHANGE UP (ref 2–14)
NEUTROPHILS # BLD AUTO: 7 K/UL — SIGNIFICANT CHANGE UP (ref 1.8–7.4)
NEUTROPHILS NFR BLD AUTO: 75.5 % — SIGNIFICANT CHANGE UP (ref 43–77)
PLATELET # BLD AUTO: 261 K/UL — SIGNIFICANT CHANGE UP (ref 150–400)
POTASSIUM SERPL-MCNC: 3.9 MMOL/L — SIGNIFICANT CHANGE UP (ref 3.5–5.3)
POTASSIUM SERPL-SCNC: 3.9 MMOL/L — SIGNIFICANT CHANGE UP (ref 3.5–5.3)
PROT SERPL-MCNC: 7.3 G/DL — SIGNIFICANT CHANGE UP (ref 6–8.3)
RBC # BLD: 4.38 M/UL — SIGNIFICANT CHANGE UP (ref 3.8–5.2)
RBC # FLD: 12.8 % — SIGNIFICANT CHANGE UP (ref 10.3–14.5)
SODIUM SERPL-SCNC: 138 MMOL/L — SIGNIFICANT CHANGE UP (ref 135–145)
WBC # BLD: 9.3 K/UL — SIGNIFICANT CHANGE UP (ref 3.8–10.5)
WBC # FLD AUTO: 9.3 K/UL — SIGNIFICANT CHANGE UP (ref 3.8–10.5)

## 2019-05-07 PROCEDURE — 61781 SCAN PROC CRANIAL INTRA: CPT

## 2019-05-07 PROCEDURE — 70450 CT HEAD/BRAIN W/O DYE: CPT | Mod: 26

## 2019-05-07 PROCEDURE — 62223 ESTABLISH BRAIN CAVITY SHUNT: CPT | Mod: 62

## 2019-05-07 RX ORDER — HYDROMORPHONE HYDROCHLORIDE 2 MG/ML
0.5 INJECTION INTRAMUSCULAR; INTRAVENOUS; SUBCUTANEOUS
Qty: 0 | Refills: 0 | Status: DISCONTINUED | OUTPATIENT
Start: 2019-05-07 | End: 2019-05-08

## 2019-05-07 RX ORDER — ACETAZOLAMIDE 250 MG/1
500 TABLET ORAL DAILY
Qty: 0 | Refills: 0 | Status: DISCONTINUED | OUTPATIENT
Start: 2019-05-07 | End: 2019-05-09

## 2019-05-07 RX ORDER — DEXTROSE MONOHYDRATE, SODIUM CHLORIDE, AND POTASSIUM CHLORIDE 50; .745; 4.5 G/1000ML; G/1000ML; G/1000ML
1000 INJECTION, SOLUTION INTRAVENOUS
Qty: 0 | Refills: 0 | Status: DISCONTINUED | OUTPATIENT
Start: 2019-05-07 | End: 2019-05-09

## 2019-05-07 RX ORDER — ONDANSETRON 8 MG/1
4 TABLET, FILM COATED ORAL EVERY 6 HOURS
Qty: 0 | Refills: 0 | Status: DISCONTINUED | OUTPATIENT
Start: 2019-05-07 | End: 2019-05-07

## 2019-05-07 RX ORDER — CEFAZOLIN SODIUM 1 G
2000 VIAL (EA) INJECTION EVERY 8 HOURS
Qty: 0 | Refills: 0 | Status: COMPLETED | OUTPATIENT
Start: 2019-05-07 | End: 2019-05-08

## 2019-05-07 RX ORDER — LIDOCAINE HCL 20 MG/ML
0.2 VIAL (ML) INJECTION ONCE
Qty: 0 | Refills: 0 | Status: DISCONTINUED | OUTPATIENT
Start: 2019-05-07 | End: 2019-05-07

## 2019-05-07 RX ORDER — SODIUM CHLORIDE 9 MG/ML
3 INJECTION INTRAMUSCULAR; INTRAVENOUS; SUBCUTANEOUS EVERY 8 HOURS
Qty: 0 | Refills: 0 | Status: DISCONTINUED | OUTPATIENT
Start: 2019-05-07 | End: 2019-05-07

## 2019-05-07 RX ORDER — ACETAMINOPHEN 500 MG
1000 TABLET ORAL ONCE
Qty: 0 | Refills: 0 | Status: COMPLETED | OUTPATIENT
Start: 2019-05-07 | End: 2019-05-07

## 2019-05-07 RX ORDER — OXYCODONE HYDROCHLORIDE 5 MG/1
5 TABLET ORAL EVERY 4 HOURS
Qty: 0 | Refills: 0 | Status: DISCONTINUED | OUTPATIENT
Start: 2019-05-07 | End: 2019-05-08

## 2019-05-07 RX ORDER — ONDANSETRON 8 MG/1
4 TABLET, FILM COATED ORAL EVERY 6 HOURS
Qty: 0 | Refills: 0 | Status: DISCONTINUED | OUTPATIENT
Start: 2019-05-07 | End: 2019-05-09

## 2019-05-07 RX ORDER — CHLORHEXIDINE GLUCONATE 213 G/1000ML
1 SOLUTION TOPICAL DAILY
Qty: 0 | Refills: 0 | Status: DISCONTINUED | OUTPATIENT
Start: 2019-05-07 | End: 2019-05-07

## 2019-05-07 RX ORDER — METOCLOPRAMIDE HCL 10 MG
10 TABLET ORAL ONCE
Qty: 0 | Refills: 0 | Status: COMPLETED | OUTPATIENT
Start: 2019-05-07 | End: 2019-05-07

## 2019-05-07 RX ORDER — ACETAMINOPHEN 500 MG
650 TABLET ORAL EVERY 6 HOURS
Qty: 0 | Refills: 0 | Status: DISCONTINUED | OUTPATIENT
Start: 2019-05-07 | End: 2019-05-08

## 2019-05-07 RX ORDER — DOCUSATE SODIUM 100 MG
100 CAPSULE ORAL THREE TIMES A DAY
Qty: 0 | Refills: 0 | Status: DISCONTINUED | OUTPATIENT
Start: 2019-05-07 | End: 2019-05-09

## 2019-05-07 RX ORDER — OXYCODONE HYDROCHLORIDE 5 MG/1
10 TABLET ORAL EVERY 4 HOURS
Qty: 0 | Refills: 0 | Status: DISCONTINUED | OUTPATIENT
Start: 2019-05-07 | End: 2019-05-08

## 2019-05-07 RX ADMIN — DEXTROSE MONOHYDRATE, SODIUM CHLORIDE, AND POTASSIUM CHLORIDE 75 MILLILITER(S): 50; .745; 4.5 INJECTION, SOLUTION INTRAVENOUS at 14:21

## 2019-05-07 RX ADMIN — Medication 10 MILLIGRAM(S): at 23:16

## 2019-05-07 RX ADMIN — Medication 400 MILLIGRAM(S): at 22:05

## 2019-05-07 RX ADMIN — HYDROMORPHONE HYDROCHLORIDE 0.5 MILLIGRAM(S): 2 INJECTION INTRAMUSCULAR; INTRAVENOUS; SUBCUTANEOUS at 14:12

## 2019-05-07 RX ADMIN — ONDANSETRON 4 MILLIGRAM(S): 8 TABLET, FILM COATED ORAL at 20:41

## 2019-05-07 RX ADMIN — Medication 1000 MILLIGRAM(S): at 22:45

## 2019-05-07 RX ADMIN — HYDROMORPHONE HYDROCHLORIDE 0.5 MILLIGRAM(S): 2 INJECTION INTRAMUSCULAR; INTRAVENOUS; SUBCUTANEOUS at 19:15

## 2019-05-07 RX ADMIN — Medication 100 MILLIGRAM(S): at 20:41

## 2019-05-07 RX ADMIN — HYDROMORPHONE HYDROCHLORIDE 0.5 MILLIGRAM(S): 2 INJECTION INTRAMUSCULAR; INTRAVENOUS; SUBCUTANEOUS at 14:30

## 2019-05-07 RX ADMIN — HYDROMORPHONE HYDROCHLORIDE 0.5 MILLIGRAM(S): 2 INJECTION INTRAMUSCULAR; INTRAVENOUS; SUBCUTANEOUS at 18:50

## 2019-05-07 NOTE — BRIEF OPERATIVE NOTE - NSICDXBRIEFPOSTOP_GEN_ALL_CORE_FT
POST-OP DIAGNOSIS:  IIH (idiopathic intracranial hypertension) 07-May-2019 12:21:11  Paolo Cronin
POST-OP DIAGNOSIS:  IIH (idiopathic intracranial hypertension) 07-May-2019 12:21:11  Paolo Cronin

## 2019-05-07 NOTE — PROGRESS NOTE ADULT - SUBJECTIVE AND OBJECTIVE BOX
Patient seen and examined at bedside.    T(C): 36.3 (05-07-19 @ 16:00), Max: 36.9 (05-07-19 @ 09:21)  HR: 63 (05-07-19 @ 16:30) (59 - 80)  BP: 114/71 (05-07-19 @ 16:30) (109/72 - 126/70)  RR: 16 (05-07-19 @ 16:30) (14 - 18)  SpO2: 100% (05-07-19 @ 16:30) (97% - 100%)  Wt(kg): --    Exam:    AOx3, FC, PERRL, EOMI, V1-3 intact, no facial, tongue midline, shrug 5/5  5/5 throughout, no drift  SILT  No clonus or babinski

## 2019-05-07 NOTE — BRIEF OPERATIVE NOTE - OPERATION/FINDINGS
occipital vps cetras at 4 patent in abd
Laparoscopy,  shunt placement using tunneled catheter.    See operative report.

## 2019-05-07 NOTE — BRIEF OPERATIVE NOTE - NSICDXBRIEFPROCEDURE_GEN_ALL_CORE_FT
PROCEDURES:  Ventriculoperitoneal shunt 07-May-2019 12:20:33  Paolo Cronin
PROCEDURES:  Ventriculoperitoneal shunt 07-May-2019 12:20:33  Paolo Cronin

## 2019-05-07 NOTE — BRIEF OPERATIVE NOTE - NSICDXBRIEFPREOP_GEN_ALL_CORE_FT
PRE-OP DIAGNOSIS:  Idiopathic intracranial hypertension 07-May-2019 12:20:57  Paolo Cronin
PRE-OP DIAGNOSIS:  Idiopathic intracranial hypertension 07-May-2019 12:20:57  Paolo Cronin

## 2019-05-08 LAB
ANION GAP SERPL CALC-SCNC: 11 MMOL/L — SIGNIFICANT CHANGE UP (ref 5–17)
BUN SERPL-MCNC: 10 MG/DL — SIGNIFICANT CHANGE UP (ref 7–23)
CALCIUM SERPL-MCNC: 9.5 MG/DL — SIGNIFICANT CHANGE UP (ref 8.4–10.5)
CHLORIDE SERPL-SCNC: 108 MMOL/L — SIGNIFICANT CHANGE UP (ref 96–108)
CO2 SERPL-SCNC: 20 MMOL/L — LOW (ref 22–31)
CREAT SERPL-MCNC: 0.65 MG/DL — SIGNIFICANT CHANGE UP (ref 0.5–1.3)
GLUCOSE SERPL-MCNC: 136 MG/DL — HIGH (ref 70–99)
HCT VFR BLD CALC: 39.2 % — SIGNIFICANT CHANGE UP (ref 34.5–45)
HGB BLD-MCNC: 12.6 G/DL — SIGNIFICANT CHANGE UP (ref 11.5–15.5)
MCHC RBC-ENTMCNC: 28.5 PG — SIGNIFICANT CHANGE UP (ref 27–34)
MCHC RBC-ENTMCNC: 32.2 GM/DL — SIGNIFICANT CHANGE UP (ref 32–36)
MCV RBC AUTO: 88.6 FL — SIGNIFICANT CHANGE UP (ref 80–100)
PLATELET # BLD AUTO: 302 K/UL — SIGNIFICANT CHANGE UP (ref 150–400)
POTASSIUM SERPL-MCNC: 4.2 MMOL/L — SIGNIFICANT CHANGE UP (ref 3.5–5.3)
POTASSIUM SERPL-SCNC: 4.2 MMOL/L — SIGNIFICANT CHANGE UP (ref 3.5–5.3)
RBC # BLD: 4.42 M/UL — SIGNIFICANT CHANGE UP (ref 3.8–5.2)
RBC # FLD: 13 % — SIGNIFICANT CHANGE UP (ref 10.3–14.5)
SODIUM SERPL-SCNC: 139 MMOL/L — SIGNIFICANT CHANGE UP (ref 135–145)
WBC # BLD: 13.5 K/UL — HIGH (ref 3.8–10.5)
WBC # FLD AUTO: 13.5 K/UL — HIGH (ref 3.8–10.5)

## 2019-05-08 PROCEDURE — 74018 RADEX ABDOMEN 1 VIEW: CPT | Mod: 26

## 2019-05-08 RX ORDER — DIPHENHYDRAMINE HCL 50 MG
50 CAPSULE ORAL EVERY 4 HOURS
Qty: 0 | Refills: 0 | Status: DISCONTINUED | OUTPATIENT
Start: 2019-05-08 | End: 2019-05-08

## 2019-05-08 RX ORDER — ACETAMINOPHEN 500 MG
1000 TABLET ORAL ONCE
Qty: 0 | Refills: 0 | Status: COMPLETED | OUTPATIENT
Start: 2019-05-08 | End: 2019-05-08

## 2019-05-08 RX ORDER — DEXAMETHASONE 0.5 MG/5ML
10 ELIXIR ORAL ONCE
Qty: 0 | Refills: 0 | Status: COMPLETED | OUTPATIENT
Start: 2019-05-08 | End: 2019-05-08

## 2019-05-08 RX ADMIN — OXYCODONE HYDROCHLORIDE 10 MILLIGRAM(S): 5 TABLET ORAL at 07:26

## 2019-05-08 RX ADMIN — Medication 400 MILLIGRAM(S): at 18:11

## 2019-05-08 RX ADMIN — Medication 100 MILLIGRAM(S): at 15:29

## 2019-05-08 RX ADMIN — Medication 1000 MILLIGRAM(S): at 11:30

## 2019-05-08 RX ADMIN — OXYCODONE HYDROCHLORIDE 10 MILLIGRAM(S): 5 TABLET ORAL at 06:56

## 2019-05-08 RX ADMIN — ONDANSETRON 4 MILLIGRAM(S): 8 TABLET, FILM COATED ORAL at 08:37

## 2019-05-08 RX ADMIN — Medication 1000 MILLIGRAM(S): at 19:30

## 2019-05-08 RX ADMIN — Medication 100 MILLIGRAM(S): at 21:08

## 2019-05-08 RX ADMIN — Medication 100 MILLIGRAM(S): at 06:59

## 2019-05-08 RX ADMIN — Medication 102 MILLIGRAM(S): at 01:05

## 2019-05-08 RX ADMIN — Medication 400 MILLIGRAM(S): at 10:49

## 2019-05-08 RX ADMIN — DEXTROSE MONOHYDRATE, SODIUM CHLORIDE, AND POTASSIUM CHLORIDE 75 MILLILITER(S): 50; .745; 4.5 INJECTION, SOLUTION INTRAVENOUS at 06:57

## 2019-05-08 RX ADMIN — OXYCODONE HYDROCHLORIDE 10 MILLIGRAM(S): 5 TABLET ORAL at 00:44

## 2019-05-08 RX ADMIN — ACETAZOLAMIDE 500 MILLIGRAM(S): 250 TABLET ORAL at 13:45

## 2019-05-08 RX ADMIN — ONDANSETRON 4 MILLIGRAM(S): 8 TABLET, FILM COATED ORAL at 03:19

## 2019-05-08 RX ADMIN — Medication 100 MILLIGRAM(S): at 06:56

## 2019-05-08 RX ADMIN — OXYCODONE HYDROCHLORIDE 10 MILLIGRAM(S): 5 TABLET ORAL at 01:20

## 2019-05-08 NOTE — PHYSICAL THERAPY INITIAL EVALUATION ADULT - PERTINENT HX OF CURRENT PROBLEM, REHAB EVAL
Pt is a 35 y/o female admitted to [facility] on 5/7/19  obese female, Jehovah Witness, w/ pseudotumor cerebri c/o blurry vision, headaches, gait disturbances - feels "off-balance", and forgetfulness for several months; s/p cerebral angio w/ stenting in 2/2019 w/ some improvement of her HAs but her vision is the same. Pt is now s/p VPS on 5/7

## 2019-05-08 NOTE — PROGRESS NOTE ADULT - SUBJECTIVE AND OBJECTIVE BOX
SUBJECTIVE:   Vomiting x 3- bilious,  overnight to this am. Ambulating without difficulty  . No headaches   OVERNIGHT EVENTS: none    Vital Signs Last 24 Hrs  T(C): 36.9 (08 May 2019 12:26), Max: 37 (08 May 2019 03:11)  T(F): 98.5 (08 May 2019 12:26), Max: 98.6 (08 May 2019 03:11)  HR: 74 (08 May 2019 12:26) (59 - 76)  BP: 124/77 (08 May 2019 12:26) (111/69 - 138/72)  BP(mean): 92 (08 May 2019 02:27) (66 - 101)  RR: 20 (08 May 2019 12:26) (15 - 20)  SpO2: 99% (08 May 2019 12:26) (97% - 100%)    PHYSICAL EXAM:    Constitutional: No Acute Distress     Neurological: Alert Ox3, Speech clear Following Commands, Moving all Extremities 5/5 . Right cranial dressing C/D    Pulmonary: Clear to Auscultation, No rales, No rhonchi, No wheezes     Cardiovascular: S1, S2, Regular rate and rhythm     Gastrointestinal: Soft, Non-tender, Non-distended Hypoactive BS. Abdominal steristrips  in place     Extremities: No calf tenderness     LABS:                        12.6   13.5  )-----------( 302      ( 08 May 2019 11:00 )             39.2    05-08    139  |  108  |  10  ----------------------------<  136<H>  4.2   |  20<L>  |  0.65    Ca    9.5      08 May 2019 11:00    TPro  7.3  /  Alb  4.2  /  TBili  0.1<L>  /  DBili  x   /  AST  12  /  ALT  7<L>  /  AlkPhos  79  05-07        IMAGING:     AXR- non obstructive Bowel gas pattern     MEDICATIONS:    ondansetron Injectable 4 milliGRAM(s) IV Push every 6 hours PRN Nausea and/or Vomiting  acetazolamide    Tablet 500 milliGRAM(s) Oral daily  docusate sodium 100 milliGRAM(s) Oral three times a day  sodium chloride 0.9% with potassium chloride 20 mEq/L 1000 milliLiter(s) IV Continuous <Continuous>      DIET:

## 2019-05-08 NOTE — PROGRESS NOTE ADULT - ASSESSMENT
34F s/p  shunt placement on 5/7.     -pain control  - PT  -appreciate care per primary team    [facility]  [phone]

## 2019-05-08 NOTE — PROGRESS NOTE ADULT - ASSESSMENT
33 y/o obese female, Jehovah Witness, w/ pseudotumor cerebri c/o blurry vision, headaches, gait disturbances - feels "off-balance", and forgetfulness for several months; s/p cerebral angio w/ stenting in 2/2019 w/ some improvement of her HAs but her vision is the same. Presents  for VPS insertion. s/p occipital  Shunt Certa  at 4 pod #1 with post op vomiting       Plan    Neuro stable. Continue diamox   Vomiting- IV tylenol for pain. avoid narcs.. Tolerating clears. Advance diet as tolerated   No PT needs  D/c home in am if tolerating diet   DVT ppx

## 2019-05-08 NOTE — PROGRESS NOTE ADULT - SUBJECTIVE AND OBJECTIVE BOX
Surgery Progress Note    Subjective:  Yesterday pt underwent  shunt placement. Pt's headaches have improved from prior.    Objective:  Physical Exam:  Gen: NAD  EOMI   shunt in place        T(C): 36.6 (05-08-19 @ 05:19), Max: 37 (05-08-19 @ 03:11)  HR: 67 (05-08-19 @ 05:19) (59 - 80)  BP: 116/75 (05-08-19 @ 05:19) (109/72 - 138/72)  RR: 18 (05-08-19 @ 05:19) (14 - 18)  SpO2: 100% (05-08-19 @ 05:19) (97% - 100%)    05-07-19 @ 07:01  -  05-08-19 @ 07:00  --------------------------------------------------------  IN: 1665 mL / OUT: 800 mL / NET: 865 mL        LABS                        12.9   9.3   )-----------( 261      ( 07 May 2019 14:05 )             38.7     05-07    138  |  107  |  13  ----------------------------<  111<H>  3.9   |  21<L>  |  0.82    Ca    9.0      07 May 2019 14:05    TPro  7.3  /  Alb  4.2  /  TBili  0.1<L>  /  DBili  x   /  AST  12  /  ALT  7<L>  /  AlkPhos  79  05-07 Surgery Progress Note    Subjective:  Yesterday pt underwent  shunt placement. Overnight pt had a few episodes of bilious vomiting. Pt's headaches have improved from prior.    Objective:  Physical Exam:  Gen: NAD  EOMI   shunt in place        T(C): 36.6 (05-08-19 @ 05:19), Max: 37 (05-08-19 @ 03:11)  HR: 67 (05-08-19 @ 05:19) (59 - 80)  BP: 116/75 (05-08-19 @ 05:19) (109/72 - 138/72)  RR: 18 (05-08-19 @ 05:19) (14 - 18)  SpO2: 100% (05-08-19 @ 05:19) (97% - 100%)    05-07-19 @ 07:01  -  05-08-19 @ 07:00  --------------------------------------------------------  IN: 1665 mL / OUT: 800 mL / NET: 865 mL        LABS                        12.9   9.3   )-----------( 261      ( 07 May 2019 14:05 )             38.7     05-07    138  |  107  |  13  ----------------------------<  111<H>  3.9   |  21<L>  |  0.82    Ca    9.0      07 May 2019 14:05    TPro  7.3  /  Alb  4.2  /  TBili  0.1<L>  /  DBili  x   /  AST  12  /  ALT  7<L>  /  AlkPhos  79  05-07

## 2019-05-08 NOTE — CHART NOTE - NSCHARTNOTEFT_GEN_A_CORE
CAPRINI SCORE [CLOT] Score on Admission for     AGE RELATED RISK FACTORS                                                       MOBILITY RELATED FACTORS  [x ] Age 41-60 years                                            (1 Point)                  [ ] Bed rest                                                        (1 Point)  [ ] Age: 61-74 years                                           (2 Points)                 [ ] Plaster cast                                                   (2 Points)  [ ] Age= 75 years                                              (3 Points)                 [ ] Bed bound for more than 72 hours                 (2 Points)    DISEASE RELATED RISK FACTORS                                               GENDER SPECIFIC FACTORS  [ ] Edema in the lower extremities                       (1 Point)                  [ ] Pregnancy                                                     (1 Point)  [ ] Varicose veins                                               (1 Point)                  [ ] Post-partum < 6 weeks                                   (1 Point)             [ ] BMI > 25 Kg/m2                                            (1 Point)                  [ ] Hormonal therapy  or oral contraception          (1 Point)                 [ ] Sepsis (in the previous month)                        (1 Point)                  [ ] History of pregnancy complications                 (1 point)  [ ] Pneumonia or serious lung disease                                               [ ] Unexplained or recurrent                     (1 Point)           (in the previous month)                               (1 Point)  [ ] Abnormal pulmonary function test                     (1 Point)                 SURGERY RELATED RISK FACTORS (include planned surgeries)  [ ] Acute myocardial infarction                              (1 Point)                 [ ]  Section                                             (1 Point)  [ ] Congestive heart failure (in the previous month)  (1 Point)         [ ] Minor surgery                                                  (1 Point)   [ ] Inflammatory bowel disease                             (1 Point)                 [ ] Arthroscopic surgery                                        (2 Points)  [ ] Central venous access                                      (2 Points)                [ ] General surgery lasting more than 45 minutes   (2 Points)       [ ] Stroke (in the previous month)                          (5 Points)               [ ] Elective arthroplasty                                         (5 Points)            [ ] current or past malignancy                              (2 Points)                                                                                                       HEMATOLOGY RELATED FACTORS                                                 TRAUMA RELATED RISK FACTORS  [ ] Prior episodes of VTE                                     (3 Points)                [ ] Fracture of the hip, pelvis, or leg                       (5 Points)  [ ] Positive family history for VTE                         (3 Points)                 [ ] Acute spinal cord injury (in the previous month)  (5 Points)  [ ] Prothrombin 48123 A                                     (3 Points)                 [ ] Paralysis  (less than 1 month)                             (5 Points)  [ ] Factor V Leiden                                             (3 Points)                  [ ] Multiple Trauma within 1 month                        (5 Points)  [ ] Lupus anticoagulants                                     (3 Points)                                                           [ ] Anticardiolipin antibodies                               (3 Points)                                                       [ ] High homocysteine in the blood                      (3 Points)                                             [ ] Other congenital or acquired thrombophilia      (3 Points)                                                [ ] Heparin induced thrombocytopenia                  (3 Points)                                          Total Score [    1      ]    Risk:  Very low 0   Low 1 to 2   Moderate 3 to 4   High =5       VTE Prophylasix Recommednations:  [ ] mechanical pneumatic compression devices                                      [ ] contraindicated: _____________________  [ x] chemo prophylasix                                                                                   [ ] contraindicated _____________________    **** HIGH LIKELIHOOD DVT PRESENT ON ADMISSION  [ ] (please order LE dopplers within 24 hours of admission)

## 2019-05-09 ENCOUNTER — TRANSCRIPTION ENCOUNTER (OUTPATIENT)
Age: 34
End: 2019-05-09

## 2019-05-09 VITALS
SYSTOLIC BLOOD PRESSURE: 129 MMHG | DIASTOLIC BLOOD PRESSURE: 83 MMHG | OXYGEN SATURATION: 99 % | TEMPERATURE: 98 F | RESPIRATION RATE: 18 BRPM | HEART RATE: 77 BPM

## 2019-05-09 PROCEDURE — 81025 URINE PREGNANCY TEST: CPT

## 2019-05-09 PROCEDURE — 85027 COMPLETE CBC AUTOMATED: CPT

## 2019-05-09 PROCEDURE — 80048 BASIC METABOLIC PNL TOTAL CA: CPT

## 2019-05-09 PROCEDURE — 74018 RADEX ABDOMEN 1 VIEW: CPT

## 2019-05-09 PROCEDURE — 70450 CT HEAD/BRAIN W/O DYE: CPT

## 2019-05-09 PROCEDURE — 97161 PT EVAL LOW COMPLEX 20 MIN: CPT

## 2019-05-09 PROCEDURE — C1889: CPT

## 2019-05-09 PROCEDURE — 80053 COMPREHEN METABOLIC PANEL: CPT

## 2019-05-09 RX ORDER — ACETAZOLAMIDE 250 MG/1
1 TABLET ORAL
Qty: 0 | Refills: 0 | DISCHARGE

## 2019-05-09 RX ORDER — OXYCODONE AND ACETAMINOPHEN 5; 325 MG/1; MG/1
1 TABLET ORAL EVERY 6 HOURS
Refills: 0 | Status: DISCONTINUED | OUTPATIENT
Start: 2019-05-09 | End: 2019-05-09

## 2019-05-09 RX ADMIN — ACETAZOLAMIDE 500 MILLIGRAM(S): 250 TABLET ORAL at 12:11

## 2019-05-09 RX ADMIN — OXYCODONE AND ACETAMINOPHEN 1 TABLET(S): 5; 325 TABLET ORAL at 10:24

## 2019-05-09 RX ADMIN — Medication 100 MILLIGRAM(S): at 05:06

## 2019-05-09 RX ADMIN — Medication 100 MILLIGRAM(S): at 12:12

## 2019-05-09 NOTE — PROGRESS NOTE ADULT - SUBJECTIVE AND OBJECTIVE BOX
SUBJECTIVE: Patient seen and examined at bedside.  States that nausea and vomiting have improved.  Eating well.    CHIEF COMPLAINT: headaches, pseudotumor    OVERNIGHT EVENTS: none; nausea and vomiting yesterday    Vital Signs Last 24 Hrs  T(C): 36.6 (09 May 2019 08:10), Max: 37.1 (08 May 2019 15:55)  T(F): 97.8 (09 May 2019 08:10), Max: 98.8 (08 May 2019 15:55)  HR: 66 (09 May 2019 08:10) (66 - 103)  BP: 136/87 (09 May 2019 08:10) (115/74 - 136/87)  BP(mean): --  RR: 20 (09 May 2019 08:10) (18 - 20)  SpO2: 94% (09 May 2019 08:10) (94% - 100%)    PHYSICAL EXAM:    General: No Acute Distress     Neurological: Alert and oriented to self, place and time.  Speech clear.  Follows commands, GARCÍA.    Pulmonary: Clear to Auscultation, No Rales, No Rhonchi, No Wheezes     Cardiovascular: S1, S2, Regular Rate and Rhythm     Gastrointestinal: Soft, Nontender, Nondistended     Incision: +staples, clean and dry    LABS:                        12.6   13.5  )-----------( 302      ( 08 May 2019 11:00 )             39.2    05-08    139  |  108  |  10  ----------------------------<  136<H>  4.2   |  20<L>  |  0.65    Ca    9.5      08 May 2019 11:00    TPro  7.3  /  Alb  4.2  /  TBili  0.1<L>  /  DBili  x   /  AST  12  /  ALT  7<L>  /  AlkPhos  79  05-07 05-08 @ 07:01  -  05-09 @ 07:00  --------------------------------------------------------  IN: 140 mL / OUT: 0 mL / NET: 140 mL        MEDICATIONS:  Antibiotics:  ceFAZolin   IVPB 2000 milliGRAM(s) IV Intermittent once    Neuro:  ondansetron Injectable 4 milliGRAM(s) IV Push every 6 hours PRN Nausea and/or Vomiting    Cardiac:  acetazolamide    Tablet 500 milliGRAM(s) Oral daily    Pulm:    GI/:  docusate sodium 100 milliGRAM(s) Oral three times a day    Other:   sodium chloride 0.9% with potassium chloride 20 mEq/L 1000 milliLiter(s) IV Continuous <Continuous>    DIET: [X] Regular [] CCD [] Renal [] Puree [] Dysphagia [] Tube Feeds:     IMAGING: < from: Xray Abdomen 1 View PORTABLE -Routine (05.08.19 @ 09:05) >    IMPRESSION:   Nonobstructive bowel gas pattern without evidence of free air.    < end of copied text >  < from: CT Head No Cont (05.07.19 @ 18:32) >  IMPRESSION:    Interval placement of right parietal approach ventriculostomy catheter   which terminates just beyond the anterior edge of the right frontal horn.    Ventricle stable in size. No hydrocephalus.    < end of copied text >

## 2019-05-09 NOTE — DISCHARGE NOTE PROVIDER - CARE PROVIDER_API CALL
Giovanni Dunn (DO)  Neurological Surgery  [address] [place] [address], [address]  [place] [address]  Phone: [phone]  Fax: [phone]  Follow Up Time:

## 2019-05-09 NOTE — DISCHARGE NOTE PROVIDER - NSDCCPTREATMENT_GEN_ALL_CORE_FT
PRINCIPAL PROCEDURE  Procedure: Ventriculoperitoneal shunt  Findings and Treatment: PRINCIPAL PROCEDURE  Procedure: Ventriculoperitoneal shunt  Findings and Treatment: Please make wound clean and dry as instructed  Follow up appointment with Dr. Dunn is on 5/16/19 at 1pm with LINDA Kaur  If any worsening headache or nausea/ vomiting, please call office

## 2019-05-09 NOTE — DISCHARGE NOTE NURSING/CASE MANAGEMENT/SOCIAL WORK - NSDCDPATPORTLINK_GEN_ALL_CORE
You can access the [facility] Portal, offered by [facility], by registering with the following website: [email or web address]

## 2019-05-09 NOTE — DISCHARGE NOTE PROVIDER - HOSPITAL COURSE
The patient is a 33-year-old female who had been suffering from left-sided headaches since March of 2018. The patient's headaches became associated with blurry vision in August. The patient underwent two spinal taps, with an opening pressure of 36 and 33. The patient was diagnosed with pseudotumor cerebri and placed on Diamox. Since the spinal taps and being placed on Diamox, the patient noted improvement in her headaches but not her blurry vision. However, an evaluation with her Neuro-Ophthalmologist revealed some improvement in her papilledema. 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 Valeriy Onofre, and underwent a cerebral angiogram, angioplasty, and stenting on 2/12/19. Postoperatively, the patient noted further improvement in her headaches but not her blurry vision. This admission, the patient underwent an elective VPS insertion, Certas @ 4.0.  Postop patient experienced nausea and vomiting which has now resolved.  Will discharge patient home today.  Instructions given.

## 2019-05-09 NOTE — DISCHARGE NOTE PROVIDER - NSDCACTIVITY_GEN_ALL_CORE
Walking - Indoors allowed/No heavy lifting/straining/Walking - Outdoors allowed/Bathing allowed/Do not drive or operate machinery/Stairs allowed

## 2019-05-09 NOTE — DISCHARGE NOTE PROVIDER - NSDCCPCAREPLAN_GEN_ALL_CORE_FT
PRINCIPAL DISCHARGE DIAGNOSIS  Diagnosis: Benign intracranial hypertension  Assessment and Plan of Treatment: S/P VPS insertion      SECONDARY DISCHARGE DIAGNOSES  Diagnosis: Pseudotumor cerebri  Assessment and Plan of Treatment: f/up with neuropthalmologist  cont diamox

## 2019-05-09 NOTE — PROGRESS NOTE ADULT - ASSESSMENT
The patient is a 33-year-old female who had been suffering from left-sided headaches since March of 2018. The patient's headaches became associated with blurry vision in August. The patient underwent two spinal taps, with an opening pressure of 36 and 33. The patient was diagnosed with pseudotumor cerebri and placed on Diamox. Since the spinal taps and being placed on Diamox, the patient noted improvement in her headaches but not her blurry vision. However, an evaluation with her Neuro-Ophthalmologist revealed some improvement in her papilledema. 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 Valeriy Onofre, and underwent a cerebral angiogram, angioplasty, and stenting on 2/12/19. Postoperatively, the patient noted further improvement in her headaches but not her blurry vision.     PROCEDURE: 5/7/19 S/P Ventriculoperitoneal shunt insertion, Certas @ 4.0     POD# 2    PLAN:   NEURO: Doing well; continue diamox, analgesics prn as tolerated; To f/up with Dr. Dunn as outpt.  PULM: room air, encouraged use of incentive spirometry  HEME/ONC:  h/h stable             DVT ppx: [] SQL [] SQH [X] Venodynes bilaterally   RENAL:   ID: afebrile  GI: Regular diet, bowel regimen  DISCHARGE PLANNING: Will discharge patient home today.  To follow up with Dr. Dunn in office. PT: no needs      Assessment:  Please Check When Present   []  GCS  E   V  M     Heart Failure: []Acute, [] acute on chronic , []chronic  Heart Failure:  [] Diastolic (HFpEF), [] Systolic (HFrEF), []Combined (HFpEF and HFrEF), [] RHF, [] Pulm HTN, [] Other    [] AILEEN, [] ATN, [] AIN, [] other  [] CKD1, [] CKD2, [] CKD 3, [] CKD 4, [] CKD 5, []ESRD    Encephalopathy: [] Metabolic, [] Hepatic, [] toxic, [] Neurological, [] Other    Abnormal Nurtitional Status: [] malnurtition (see nutrition note), [ ]underweight: BMI < 19, [] morbid obesity: BMI >40, [] Cachexia    [] Sepsis  [] hypovolemic shock,[] cardiogenic shock, [] hemorrhagic shock, [] neuogenic shock  [] Acute Respiratory Failure  []Cerebral edema, [] Brain compression/ herniation,   [] Functional quadriplegia  [] Acute blood loss anemia    Will discuss plan with Dr. Mona Short # [id number]

## 2019-05-09 NOTE — PROGRESS NOTE ADULT - ATTENDING COMMENTS
I saw and evaluated the patient. The patient is a 33-year-old female who had been suffering from left-sided headaches since March of 2018. The patient's headaches became associated with blurry vision in August. The patient underwent two spinal taps, with an opening pressure of 36 and 33. The patient was diagnosed with pseudotumor cerebri and placed on Diamox. Since the spinal taps and being placed on Diamox, the patient noted improvement in her headaches but not her blurry vision. However, an evaluation with her Neuro-Ophthalmologist revealed some improvement in her papilledema.     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 Valeriy Onofre, and underwent a cerebral angiogram, angioplasty, and stenting on 2/12/19. Postoperatively, the patient noted further improvement in her headaches but not her blurry vision. The patient now cannot see colors and is extremely near sighted, having to hold papers within a couple inches of her eyes in order to read or write. A third LP revealed an opening pressure of 29.     The patient underwent a stereotactic laparoscopic insertion of a right parietal ventriculoperitoneal shunt with a Codman Certas programmable valve with a SiphonGuard set to 4.0 on 5/7/19. Postoperatively, the patient is neurologically stable. Continue mobilization OOB with PT, and plan for disposition home.
I saw and evaluated the patient. The patient is a 33-year-old female who had been suffering from left-sided headaches since March of 2018. The patient's headaches became associated with blurry vision in August. The patient underwent two spinal taps, with an opening pressure of 36 and 33. The patient was diagnosed with pseudotumor cerebri and placed on Diamox. Since the spinal taps and being placed on Diamox, the patient noted improvement in her headaches but not her blurry vision. However, an evaluation with her Neuro-Ophthalmologist revealed some improvement in her papilledema.     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 Valeriy Onofre, and underwent a cerebral angiogram, angioplasty, and stenting on 2/12/19. Postoperatively, the patient noted further improvement in her headaches but not her blurry vision. The patient now cannot see colors and is extremely near sighted, having to hold papers within a couple inches of her eyes in order to read or write. A third LP revealed an opening pressure of 29.     The patient underwent a stereotactic laparoscopic insertion of a right parietal ventriculoperitoneal shunt with a Codman Certas programmable valve with a SiphonGuard set to 4.0. Postoperatively, the patient is neurologically stable. Begin mobilization OOB with PT and disposition planning.
Patient seen/examined.  Agree w above note and plan and have discussed plan w house staff.  C/o nausea, emesis; pain controlled.  Abdomen soft, wounds clean.  Please re-consult as needed    Adrian Kenney MD

## 2019-05-15 ENCOUNTER — APPOINTMENT (OUTPATIENT)
Dept: SPINE | Facility: CLINIC | Age: 34
End: 2019-05-15

## 2019-05-15 VITALS
HEIGHT: 61 IN | SYSTOLIC BLOOD PRESSURE: 126 MMHG | WEIGHT: 258 LBS | BODY MASS INDEX: 48.71 KG/M2 | DIASTOLIC BLOOD PRESSURE: 81 MMHG

## 2019-05-16 NOTE — REASON FOR VISIT
[Friend] : friend [de-identified] : Right parietal occipital VPS placement  [de-identified] : 5/7/2019 [de-identified] : PTC

## 2019-05-16 NOTE — PHYSICAL EXAM
[FreeTextEntry1] : staples removed from incisional line of right parietal region.  Site intact and skin intact  No redness, swelling,  or draiange.   Abdominal incisions intact and skin clean and dry

## 2019-05-16 NOTE — HISTORY OF PRESENT ILLNESS
[FreeTextEntry1] : Ms. Nolasco was seen today - 8 day post op for a VPS placement of the right parietal site .  She has suffered from PTC and left sided headaches for a year.  Blurry vision is present with the headaches and Diamox has not been effective for relief.  In addition in February she underwent a formal cerebral angiography study where an angioplasty was performed and a stent was placed across her left transverse sigmoid junction.  High lumbar opening pressures were obtained and after management with a stent and medical therapy, a programmable codman certas VPS set at 4 cm H2o was placed to treat elevated opening pressures.  \par \par Today she reports that her headaches have been resolved for some time and she remains with blurry vision.   The patient reports a recent opthomology examination that is unchanged.  She hs noted improvement of her gait and balance  disturbance.  She has abdominal discomfort where the incisions were placed.  They are intact and sutures have completely resolved.  She has no fever and admits to difficulty with cramping and left groin pain.

## 2019-05-16 NOTE — ASSESSMENT
[FreeTextEntry1] : Assess:\par PTC\par Failed Diamox and stent of left transverse sinus junction\par \par PLAN:\par Continue Diamox daily - One tab\par Return in three to four weeks for follow up\par Contact office for any new symptoms\par Keep incision clean and dry, signs of infection reviewed and understood

## 2019-05-16 NOTE — REVIEW OF SYSTEMS
[Negative] : Heme/Lymph [de-identified] : blurry vision  [FreeTextEntry7] : abdominal cramping and left sided groin pain

## 2019-05-17 ENCOUNTER — OTHER (OUTPATIENT)
Age: 34
End: 2019-05-17

## 2019-05-17 ENCOUNTER — MOBILE ON CALL (OUTPATIENT)
Age: 34
End: 2019-05-17

## 2019-06-10 ENCOUNTER — APPOINTMENT (OUTPATIENT)
Dept: SPINE | Facility: CLINIC | Age: 34
End: 2019-06-10
Payer: MEDICARE

## 2019-06-10 VITALS
BODY MASS INDEX: 48.71 KG/M2 | HEIGHT: 61 IN | WEIGHT: 258 LBS | SYSTOLIC BLOOD PRESSURE: 132 MMHG | DIASTOLIC BLOOD PRESSURE: 84 MMHG

## 2019-06-10 PROCEDURE — 99024 POSTOP FOLLOW-UP VISIT: CPT

## 2019-06-10 RX ORDER — ACETAZOLAMIDE 500 MG/1
500 CAPSULE, EXTENDED RELEASE ORAL
Refills: 0 | Status: DISCONTINUED | COMMUNITY
End: 2019-06-10

## 2019-06-10 NOTE — PHYSICAL EXAM
[General Appearance - Alert] : alert [General Appearance - In No Acute Distress] : in no acute distress [Irregular] : irregular [Clean] : clean [Dry] : dry [Healing Well] : healing well [No Drainage] : without drainage [Normal Skin] : normal [Normal Skin Turgor] : skin turgor was normal [Oriented To Time, Place, And Person] : oriented to person, place, and time [Impaired Insight] : insight and judgment were intact [Affect] : the affect was normal [Person] : oriented to person [Place] : oriented to place [Time] : oriented to time [Short Term Intact] : short term memory intact [Remote Intact] : remote memory intact [Span Intact] : the attention span was normal [Concentration Intact] : normal concentrating ability [Fluency] : fluency intact [Comprehension] : comprehension intact [Current Events] : adequate knowledge of current events [Past History] : adequate knowledge of personal past history [Vocabulary] : adequate range of vocabulary [Cranial Nerves Optic (II)] : visual acuity intact bilaterally,  pupils equal round and reactive to light [Cranial Nerves Oculomotor (III)] : extraocular motion intact [Cranial Nerves Trigeminal (V)] : facial sensation intact symmetrically [Cranial Nerves Facial (VII)] : face symmetrical [Cranial Nerves Vestibulocochlear (VIII)] : hearing was intact bilaterally [Cranial Nerves Glossopharyngeal (IX)] : tongue and palate midline [Cranial Nerves Accessory (XI - Cranial And Spinal)] : head turning and shoulder shrug symmetric [Cranial Nerves Hypoglossal (XII)] : there was no tongue deviation with protrusion [Motor Strength] : muscle strength was normal in all four extremities [No Muscle Atrophy] : normal bulk in all four extremities [Sensation Tactile Decrease] : light touch was intact [Balance] : balance was intact [Past-pointing] : there was no past-pointing [Tremor] : no tremor present [2+] : Patella left 2+ [L'Hermitte's] : neck flexion did not produce tingling down the spine/arms [Spurling's - Opposite Side] : Negative Spurling's on opposite side [Spurling's Same Side] : Negative Spurling's on same side [No Visual Abnormalities] : no visible abnormailities [No Tenderness to Palpation] : no spine tenderness on palpation [Full ROM] : full ROM [No Pain with ROM] : no pain with motion in any direction [Straight-Leg Raise Test - Left] : straight leg raise of the left leg was negative [Straight-Leg Raise Test - Right] : straight leg raise  of the right leg was negative [Normal] : normal [Intact] : all reflexes within normal limits bilaterally [Sclera] : the sclera and conjunctiva were normal [PERRL With Normal Accommodation] : pupils were equal in size, round, reactive to light, with normal accommodation [Extraocular Movements] : extraocular movements were intact [Outer Ear] : the ears and nose were normal in appearance [Oropharynx] : the oropharynx was normal [Neck Appearance] : the appearance of the neck was normal [Neck Cervical Mass (___cm)] : no neck mass was observed [Jugular Venous Distention Increased] : there was no jugular-venous distention [Thyroid Diffuse Enlargement] : the thyroid was not enlarged [Thyroid Nodule] : there were no palpable thyroid nodules [Auscultation Breath Sounds / Voice Sounds] : lungs were clear to auscultation bilaterally [Heart Rate And Rhythm] : heart rate was normal and rhythm regular [Heart Sounds] : normal S1 and S2 [Heart Sounds Gallop] : no gallops [Murmurs] : no murmurs [Heart Sounds Pericardial Friction Rub] : no pericardial rub [Full Pulse] : the pedal pulses are present [Edema] : there was no peripheral edema [Bowel Sounds] : normal bowel sounds [Abdomen Soft] : soft [Abdomen Tenderness] : non-tender [Abdomen Mass (___ Cm)] : no abdominal mass palpated [No CVA Tenderness] : no ~M costovertebral angle tenderness [No Spinal Tenderness] : no spinal tenderness [Abnormal Walk] : normal gait [Nail Clubbing] : no clubbing  or cyanosis of the fingernails [Musculoskeletal - Swelling] : no joint swelling seen [Motor Tone] : muscle strength and tone were normal [Skin Color & Pigmentation] : normal skin color and pigmentation [Skin Turgor] : normal skin turgor [] : no rash

## 2019-06-10 NOTE — ASSESSMENT
[FreeTextEntry1] : The patient is a 34-year-old female s/p insertion of a right parietal ventriculoperitoneal shunt with a Certas programmable valve with a SiphonGuard set to 4.0. The patient should follow up with her Neuro-Ophthalmologist. The patient should follow up with me in 2 months for a 3 month postoperative visit.

## 2019-06-10 NOTE — REASON FOR VISIT
[Follow-Up: _____] : a [unfilled] follow-up visit [Family Member] : family member [FreeTextEntry1] : One month follow up VPS 5/7/2019 for PTC

## 2019-06-10 NOTE — REVIEW OF SYSTEMS
[Negative] : Heme/Lymph [Migraine Headache] : migraine headaches [Eyesight Problems] : eyesight problems [Abdominal Pain] : abdominal pain [As Noted in HPI] : as noted in HPI [Arthralgias] : arthralgias [de-identified] : HA resolved, blurry vision slightly improved [FreeTextEntry1] : No bowel or bladder changes

## 2019-06-10 NOTE — HISTORY OF PRESENT ILLNESS
[FreeTextEntry1] : I had the pleasure of seeing Ms. Sima Weaver for a postoperative visit to my office today. Ms. Weaver is a pleasant 34-year-old female who had been suffering from left-sided headaches since March of 2018. The patient's headaches became associated with blurry vision in August. The patient underwent two spinal taps, with an opening pressure of 36 and 33. The patient was diagnosed with pseudotumor cerebri and placed on Diamox. Since the spinal taps and being placed on Diamox, the patient noted improvement in her headaches but not her blurry vision. However, an evaluation with her Neuro-Ophthalmologist revealed improvement in her papilledema. 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 Valeriy Onofre, and underwent a cerebral angiogram, angioplasty, and stenting on 2/12/19. Postoperatively, the patient noted further improvement in her headaches but not her blurry vision. A third LP revealed an opening pressure of 29. \par \par The patient is now s/p insertion of a right parietal ventriculoperitoneal shunt with a Certas programmable valve with a SiphonGuard set to 4.0. Postoperatively, the patient notes further improvement in her headaches and improvement in her vision. She is now able to see words see previously could not see. The patient does note abdominal cramping. Her incisions are healing well.

## 2019-07-01 ENCOUNTER — OTHER (OUTPATIENT)
Age: 34
End: 2019-07-01

## 2019-08-26 ENCOUNTER — APPOINTMENT (OUTPATIENT)
Dept: SPINE | Facility: CLINIC | Age: 34
End: 2019-08-26
Payer: MEDICARE

## 2019-08-26 VITALS
WEIGHT: 263 LBS | HEIGHT: 63 IN | HEART RATE: 70 BPM | BODY MASS INDEX: 46.6 KG/M2 | DIASTOLIC BLOOD PRESSURE: 86 MMHG | SYSTOLIC BLOOD PRESSURE: 120 MMHG

## 2019-08-26 PROCEDURE — 99214 OFFICE O/P EST MOD 30 MIN: CPT

## 2019-08-26 NOTE — REASON FOR VISIT
[Follow-Up: _____] : a [unfilled] follow-up visit [Family Member] : family member [FreeTextEntry1] : PTC follow up and three month post Certas VPS placement

## 2019-08-26 NOTE — PHYSICAL EXAM
[General Appearance - Alert] : alert [Irregular] : irregular [General Appearance - In No Acute Distress] : in no acute distress [Clean] : clean [Dry] : dry [Well-Healed] : well-healed [No Drainage] : without drainage [Normal Skin] : normal [Normal Skin Turgor] : skin turgor was normal [Oriented To Time, Place, And Person] : oriented to person, place, and time [Impaired Insight] : insight and judgment were intact [Affect] : the affect was normal [Person] : oriented to person [Place] : oriented to place [Time] : oriented to time [Short Term Intact] : short term memory intact [Remote Intact] : remote memory intact [Span Intact] : the attention span was normal [Concentration Intact] : normal concentrating ability [Fluency] : fluency intact [Comprehension] : comprehension intact [Current Events] : adequate knowledge of current events [Past History] : adequate knowledge of personal past history [Vocabulary] : adequate range of vocabulary [Cranial Nerves Optic (II)] : visual acuity intact bilaterally,  pupils equal round and reactive to light [Cranial Nerves Oculomotor (III)] : extraocular motion intact [Cranial Nerves Trigeminal (V)] : facial sensation intact symmetrically [Cranial Nerves Facial (VII)] : face symmetrical [Cranial Nerves Vestibulocochlear (VIII)] : hearing was intact bilaterally [Cranial Nerves Glossopharyngeal (IX)] : tongue and palate midline [Cranial Nerves Accessory (XI - Cranial And Spinal)] : head turning and shoulder shrug symmetric [Cranial Nerves Hypoglossal (XII)] : there was no tongue deviation with protrusion [Motor Strength] : muscle strength was normal in all four extremities [No Muscle Atrophy] : normal bulk in all four extremities [Sensation Tactile Decrease] : light touch was intact [Balance] : balance was intact [Past-pointing] : there was no past-pointing [Tremor] : no tremor present [2+] : Patella left 2+ [L'Hermitte's] : neck flexion did not produce tingling down the spine/arms [Spurling's - Opposite Side] : Negative Spurling's on opposite side [Spurling's Same Side] : Negative Spurling's on same side [No Visual Abnormalities] : no visible abnormailities [No Tenderness to Palpation] : no spine tenderness on palpation [Full ROM] : full ROM [No Pain with ROM] : no pain with motion in any direction [Straight-Leg Raise Test - Left] : straight leg raise of the left leg was negative [Straight-Leg Raise Test - Right] : straight leg raise  of the right leg was negative [Normal] : normal [Intact] : all reflexes within normal limits bilaterally [Sclera] : the sclera and conjunctiva were normal [PERRL With Normal Accommodation] : pupils were equal in size, round, reactive to light, with normal accommodation [Extraocular Movements] : extraocular movements were intact [Outer Ear] : the ears and nose were normal in appearance [Oropharynx] : the oropharynx was normal [Neck Appearance] : the appearance of the neck was normal [Neck Cervical Mass (___cm)] : no neck mass was observed [Jugular Venous Distention Increased] : there was no jugular-venous distention [Thyroid Diffuse Enlargement] : the thyroid was not enlarged [Thyroid Nodule] : there were no palpable thyroid nodules [Auscultation Breath Sounds / Voice Sounds] : lungs were clear to auscultation bilaterally [Heart Rate And Rhythm] : heart rate was normal and rhythm regular [Heart Sounds] : normal S1 and S2 [Heart Sounds Gallop] : no gallops [Murmurs] : no murmurs [Heart Sounds Pericardial Friction Rub] : no pericardial rub [Full Pulse] : the pedal pulses are present [Edema] : there was no peripheral edema [Bowel Sounds] : normal bowel sounds [Abdomen Soft] : soft [Abdomen Tenderness] : non-tender [Abdomen Mass (___ Cm)] : no abdominal mass palpated [No CVA Tenderness] : no ~M costovertebral angle tenderness [No Spinal Tenderness] : no spinal tenderness [Abnormal Walk] : normal gait [Nail Clubbing] : no clubbing  or cyanosis of the fingernails [Musculoskeletal - Swelling] : no joint swelling seen [Motor Tone] : muscle strength and tone were normal [Skin Color & Pigmentation] : normal skin color and pigmentation [Skin Turgor] : normal skin turgor [] : no rash

## 2019-08-26 NOTE — ASSESSMENT
[FreeTextEntry1] : The patient is a 34-year-old female s/p insertion of a right parietal ventriculoperitoneal shunt with a Certas programmable valve with a SiphonGuard set to 4.0. I will order a CT of the head without contrast to evaluate her headaches. The patient should call my office after the CT is performed for review. The patient should follow up with her Neuro-Ophthalmologist. The patient should follow up with me in 3 months for a 6 month postoperative visit.

## 2019-08-26 NOTE — HISTORY OF PRESENT ILLNESS
[FreeTextEntry1] : I had the pleasure of seeing Ms. Sima Weaver for a postoperative visit to my office today. Ms. Weaver is a pleasant 34-year-old female who had been suffering from left-sided headaches since March of 2018. The patient's headaches became associated with blurry vision in August. The patient underwent two spinal taps, with an opening pressure of 36 and 33. The patient was diagnosed with pseudotumor cerebri and placed on Diamox. Since the spinal taps and being placed on Diamox, the patient noted improvement in her headaches but not her blurry vision. However, an evaluation with her Neuro-Ophthalmologist revealed improvement in her papilledema. 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 Valeriy Onofre, and underwent a cerebral angiogram, angioplasty, and stenting on 2/12/19. Postoperatively, the patient noted further improvement in her headaches but not her blurry vision. A third LP revealed an opening pressure of 29. \par \par The patient is now s/p insertion of a right parietal ventriculoperitoneal shunt with a Certas programmable valve with a SiphonGuard set to 4.0. Postoperatively, the patient initially noted further improvement in her headaches and improvement in her vision. She is now able to see words see previously could not see. However, the patient recently experienced recurrence of her headaches for a few days, but today, she has no headache. Her incisions have healed well.

## 2019-08-26 NOTE — REVIEW OF SYSTEMS
[Recent Weight Gain (___ Lbs)] : recent [unfilled] ~Ulb weight gain [Migraine Headache] : migraine headaches [Difficulty Walking] : difficulty walking [Negative] : Heme/Lymph [As Noted in HPI] : as noted in HPI [Eyesight Problems] : eyesight problems [FreeTextEntry2] : gained ten pounds  [de-identified] : HA worsening , lower back pain  [FreeTextEntry1] : No urine incontinence

## 2019-09-16 ENCOUNTER — APPOINTMENT (OUTPATIENT)
Dept: SPINE | Facility: CLINIC | Age: 34
End: 2019-09-16

## 2019-10-01 ENCOUNTER — FORM ENCOUNTER (OUTPATIENT)
Age: 34
End: 2019-10-01

## 2019-10-02 ENCOUNTER — OUTPATIENT (OUTPATIENT)
Dept: OUTPATIENT SERVICES | Facility: HOSPITAL | Age: 34
LOS: 1 days | End: 2019-10-02
Payer: MEDICARE

## 2019-10-02 ENCOUNTER — APPOINTMENT (OUTPATIENT)
Dept: CT IMAGING | Facility: CLINIC | Age: 34
End: 2019-10-02
Payer: COMMERCIAL

## 2019-10-02 DIAGNOSIS — Z98.891 HISTORY OF UTERINE SCAR FROM PREVIOUS SURGERY: Chronic | ICD-10-CM

## 2019-10-02 DIAGNOSIS — Z98.1 ARTHRODESIS STATUS: Chronic | ICD-10-CM

## 2019-10-02 DIAGNOSIS — G93.2 BENIGN INTRACRANIAL HYPERTENSION: ICD-10-CM

## 2019-10-02 PROCEDURE — 70450 CT HEAD/BRAIN W/O DYE: CPT

## 2019-10-02 PROCEDURE — 70450 CT HEAD/BRAIN W/O DYE: CPT | Mod: 26

## 2019-10-07 ENCOUNTER — APPOINTMENT (OUTPATIENT)
Dept: SPINE | Facility: CLINIC | Age: 34
End: 2019-10-07
Payer: COMMERCIAL

## 2019-10-07 VITALS
DIASTOLIC BLOOD PRESSURE: 78 MMHG | HEIGHT: 61 IN | WEIGHT: 185 LBS | BODY MASS INDEX: 34.93 KG/M2 | SYSTOLIC BLOOD PRESSURE: 117 MMHG

## 2019-10-07 DIAGNOSIS — H53.8 OTHER VISUAL DISTURBANCES: ICD-10-CM

## 2019-10-07 DIAGNOSIS — Z78.9 OTHER SPECIFIED HEALTH STATUS: ICD-10-CM

## 2019-10-07 PROCEDURE — 99214 OFFICE O/P EST MOD 30 MIN: CPT

## 2019-10-07 NOTE — ASSESSMENT
[FreeTextEntry1] : The patient is a 34-year-old female s/p insertion of a right parietal ventriculoperitoneal shunt with a Certas programmable valve with a SiphonGuard set to 4.0. The patient should follow up with her Neuro-Ophthalmologist. The patient is okay to follow up with me as needed, should any issues arise.\par

## 2019-10-07 NOTE — REVIEW OF SYSTEMS
[Negative] : Heme/Lymph [Migraine Headache] : migraine headaches [As Noted in HPI] : as noted in HPI [Eyesight Problems] : eyesight problems [de-identified] : Occasional headaches (improved since VPS) and scalp sensory changes at VPS site , blurry vision unchanged  [FreeTextEntry1] : No urine or bowel symptoms

## 2019-10-07 NOTE — PHYSICAL EXAM
[General Appearance - In No Acute Distress] : in no acute distress [General Appearance - Alert] : alert [Clean] : clean [Irregular] : irregular [Dry] : dry [Well-Healed] : well-healed [No Drainage] : without drainage [Serous] : exhibiting serous drainage [Normal Skin] : normal [Oriented To Time, Place, And Person] : oriented to person, place, and time [Normal Skin Turgor] : skin turgor was normal [Impaired Insight] : insight and judgment were intact [Affect] : the affect was normal [No Visual Abnormalities] : no visible abnormailities [No Tenderness to Palpation] : no spine tenderness on palpation [Full ROM] : full ROM [No Pain with ROM] : no pain with motion in any direction [Normal] : normal [Intact] : all reflexes within normal limits bilaterally [Sclera] : the sclera and conjunctiva were normal [PERRL With Normal Accommodation] : pupils were equal in size, round, reactive to light, with normal accommodation [Extraocular Movements] : extraocular movements were intact [Outer Ear] : the ears and nose were normal in appearance [Oropharynx] : the oropharynx was normal [Neck Cervical Mass (___cm)] : no neck mass was observed [Neck Appearance] : the appearance of the neck was normal [Jugular Venous Distention Increased] : there was no jugular-venous distention [Thyroid Diffuse Enlargement] : the thyroid was not enlarged [Thyroid Nodule] : there were no palpable thyroid nodules [Auscultation Breath Sounds / Voice Sounds] : lungs were clear to auscultation bilaterally [Heart Rate And Rhythm] : heart rate was normal and rhythm regular [Heart Sounds] : normal S1 and S2 [Heart Sounds Gallop] : no gallops [Murmurs] : no murmurs [Heart Sounds Pericardial Friction Rub] : no pericardial rub [Bowel Sounds] : normal bowel sounds [Abdomen Soft] : soft [Abdomen Tenderness] : non-tender [Abdomen Mass (___ Cm)] : no abdominal mass palpated [No CVA Tenderness] : no ~M costovertebral angle tenderness [No Spinal Tenderness] : no spinal tenderness [Nail Clubbing] : no clubbing  or cyanosis of the fingernails [Musculoskeletal - Swelling] : no joint swelling seen [Abnormal Walk] : normal gait [Motor Tone] : muscle strength and tone were normal [Skin Color & Pigmentation] : normal skin color and pigmentation [Skin Turgor] : normal skin turgor [] : no rash [L'Hermitte's] : neck flexion did not produce tingling down the spine/arms [Spurling's - Opposite Side] : Negative Spurling's on opposite side [Spurling's Same Side] : Negative Spurling's on same side [Straight-Leg Raise Test - Left] : straight leg raise of the left leg was negative [Straight-Leg Raise Test - Right] : straight leg raise  of the right leg was negative

## 2019-10-07 NOTE — REASON FOR VISIT
[Follow-Up: _____] : a [unfilled] follow-up visit [Friend] : friend [FreeTextEntry1] : Five month follow up  from placement of Codman Certas VPS fro PTC

## 2019-10-07 NOTE — HISTORY OF PRESENT ILLNESS
[FreeTextEntry1] : I had the pleasure of seeing Ms. Sima Weaver for a postoperative visit to my office today. Ms. Weaver is a pleasant 34-year-old female who had been suffering from left-sided headaches since March of 2018. The patient's headaches became associated with blurry vision in August. The patient underwent two spinal taps, with an opening pressure of 36 and 33. The patient was diagnosed with pseudotumor cerebri and placed on Diamox. Since the spinal taps and being placed on Diamox, the patient noted improvement in her headaches but not her blurry vision. However, an evaluation with her Neuro-Ophthalmologist revealed improvement in her papilledema. 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 Valeriy Onofre, and underwent a cerebral angiogram, angioplasty, and stenting on 2/12/19. Postoperatively, the patient noted further improvement in her headaches but not her blurry vision. A third LP revealed an opening pressure of 29. \par \par The patient is now s/p insertion of a right parietal ventriculoperitoneal shunt with a Certas programmable valve with a SiphonGuard set to 4.0. Postoperatively, the patient noted further improvement in her headaches and improvement in her vision. She rarely experiences headaches. She is now able to see words see previously could not see. Her incisions have healed well.

## 2019-12-26 ENCOUNTER — OTHER (OUTPATIENT)
Age: 34
End: 2019-12-26

## 2019-12-27 NOTE — REASON FOR VISIT
[Follow-Up: _____] : a [unfilled] follow-up visit [FreeTextEntry1] : LUCINDA CERTAS VALVE 4.0\par \par TODAY: Headaches Worse/ Same/ IMPROVED?\par Last Seen by Neuro-ophthalmologist?

## 2019-12-29 ENCOUNTER — EMERGENCY (EMERGENCY)
Facility: HOSPITAL | Age: 34
LOS: 1 days | Discharge: HOME | End: 2019-12-29
Attending: EMERGENCY MEDICINE
Payer: MEDICARE

## 2019-12-29 VITALS
HEIGHT: 61 IN | HEART RATE: 72 BPM | TEMPERATURE: 98 F | DIASTOLIC BLOOD PRESSURE: 65 MMHG | RESPIRATION RATE: 16 BRPM | WEIGHT: 253.09 LBS | SYSTOLIC BLOOD PRESSURE: 105 MMHG | OXYGEN SATURATION: 98 %

## 2019-12-29 VITALS
RESPIRATION RATE: 16 BRPM | HEART RATE: 93 BPM | TEMPERATURE: 98 F | DIASTOLIC BLOOD PRESSURE: 67 MMHG | OXYGEN SATURATION: 99 % | SYSTOLIC BLOOD PRESSURE: 100 MMHG

## 2019-12-29 DIAGNOSIS — Z98.891 HISTORY OF UTERINE SCAR FROM PREVIOUS SURGERY: Chronic | ICD-10-CM

## 2019-12-29 DIAGNOSIS — Z98.1 ARTHRODESIS STATUS: Chronic | ICD-10-CM

## 2019-12-29 LAB
ALBUMIN SERPL ELPH-MCNC: 4 G/DL — SIGNIFICANT CHANGE UP (ref 3.3–5)
ALP SERPL-CCNC: 84 U/L — SIGNIFICANT CHANGE UP (ref 40–120)
ALT FLD-CCNC: 17 U/L — SIGNIFICANT CHANGE UP (ref 10–45)
ANION GAP SERPL CALC-SCNC: 13 MMOL/L — SIGNIFICANT CHANGE UP (ref 5–17)
APTT BLD: 30 SEC — SIGNIFICANT CHANGE UP (ref 27.5–36.3)
AST SERPL-CCNC: 17 U/L — SIGNIFICANT CHANGE UP (ref 10–40)
BILIRUB SERPL-MCNC: 0.2 MG/DL — SIGNIFICANT CHANGE UP (ref 0.2–1.2)
BUN SERPL-MCNC: 14 MG/DL — SIGNIFICANT CHANGE UP (ref 7–23)
CALCIUM SERPL-MCNC: 9.6 MG/DL — SIGNIFICANT CHANGE UP (ref 8.4–10.5)
CHLORIDE SERPL-SCNC: 98 MMOL/L — SIGNIFICANT CHANGE UP (ref 96–108)
CO2 SERPL-SCNC: 26 MMOL/L — SIGNIFICANT CHANGE UP (ref 22–31)
CREAT SERPL-MCNC: 0.73 MG/DL — SIGNIFICANT CHANGE UP (ref 0.5–1.3)
GLUCOSE SERPL-MCNC: 127 MG/DL — HIGH (ref 70–99)
HCT VFR BLD CALC: 36.4 % — SIGNIFICANT CHANGE UP (ref 34.5–45)
HGB BLD-MCNC: 11.9 G/DL — SIGNIFICANT CHANGE UP (ref 11.5–15.5)
INR BLD: 1.19 RATIO — HIGH (ref 0.88–1.16)
MCHC RBC-ENTMCNC: 28.3 PG — SIGNIFICANT CHANGE UP (ref 27–34)
MCHC RBC-ENTMCNC: 32.7 GM/DL — SIGNIFICANT CHANGE UP (ref 32–36)
MCV RBC AUTO: 86.7 FL — SIGNIFICANT CHANGE UP (ref 80–100)
NRBC # BLD: 0 /100 WBCS — SIGNIFICANT CHANGE UP (ref 0–0)
PLATELET # BLD AUTO: 304 K/UL — SIGNIFICANT CHANGE UP (ref 150–400)
POTASSIUM SERPL-MCNC: 4.5 MMOL/L — SIGNIFICANT CHANGE UP (ref 3.5–5.3)
POTASSIUM SERPL-SCNC: 4.5 MMOL/L — SIGNIFICANT CHANGE UP (ref 3.5–5.3)
PROT SERPL-MCNC: 7.8 G/DL — SIGNIFICANT CHANGE UP (ref 6–8.3)
PROTHROM AB SERPL-ACNC: 13.8 SEC — HIGH (ref 10–12.9)
RBC # BLD: 4.2 M/UL — SIGNIFICANT CHANGE UP (ref 3.8–5.2)
RBC # FLD: 15 % — HIGH (ref 10.3–14.5)
SODIUM SERPL-SCNC: 137 MMOL/L — SIGNIFICANT CHANGE UP (ref 135–145)
WBC # BLD: 12.12 K/UL — HIGH (ref 3.8–10.5)
WBC # FLD AUTO: 12.12 K/UL — HIGH (ref 3.8–10.5)

## 2019-12-29 PROCEDURE — 74018 RADEX ABDOMEN 1 VIEW: CPT | Mod: 26

## 2019-12-29 PROCEDURE — 99284 EMERGENCY DEPT VISIT MOD MDM: CPT | Mod: GC

## 2019-12-29 PROCEDURE — 85730 THROMBOPLASTIN TIME PARTIAL: CPT

## 2019-12-29 PROCEDURE — 70250 X-RAY EXAM OF SKULL: CPT

## 2019-12-29 PROCEDURE — 74018 RADEX ABDOMEN 1 VIEW: CPT

## 2019-12-29 PROCEDURE — 84702 CHORIONIC GONADOTROPIN TEST: CPT

## 2019-12-29 PROCEDURE — 85027 COMPLETE CBC AUTOMATED: CPT

## 2019-12-29 PROCEDURE — 71045 X-RAY EXAM CHEST 1 VIEW: CPT | Mod: 26

## 2019-12-29 PROCEDURE — 80053 COMPREHEN METABOLIC PANEL: CPT

## 2019-12-29 PROCEDURE — 85610 PROTHROMBIN TIME: CPT

## 2019-12-29 PROCEDURE — 70250 X-RAY EXAM OF SKULL: CPT | Mod: 26

## 2019-12-29 PROCEDURE — 71045 X-RAY EXAM CHEST 1 VIEW: CPT

## 2019-12-29 PROCEDURE — 96374 THER/PROPH/DIAG INJ IV PUSH: CPT

## 2019-12-29 PROCEDURE — 96375 TX/PRO/DX INJ NEW DRUG ADDON: CPT

## 2019-12-29 PROCEDURE — 93005 ELECTROCARDIOGRAM TRACING: CPT

## 2019-12-29 PROCEDURE — 99284 EMERGENCY DEPT VISIT MOD MDM: CPT | Mod: 25

## 2019-12-29 RX ORDER — ACETAMINOPHEN 500 MG
975 TABLET ORAL ONCE
Refills: 0 | Status: COMPLETED | OUTPATIENT
Start: 2019-12-29 | End: 2019-12-29

## 2019-12-29 RX ORDER — KETOROLAC TROMETHAMINE 30 MG/ML
15 SYRINGE (ML) INJECTION ONCE
Refills: 0 | Status: DISCONTINUED | OUTPATIENT
Start: 2019-12-29 | End: 2019-12-29

## 2019-12-29 RX ORDER — ONDANSETRON 8 MG/1
4 TABLET, FILM COATED ORAL ONCE
Refills: 0 | Status: COMPLETED | OUTPATIENT
Start: 2019-12-29 | End: 2019-12-29

## 2019-12-29 RX ADMIN — Medication 15 MILLIGRAM(S): at 11:09

## 2019-12-29 RX ADMIN — Medication 15 MILLIGRAM(S): at 11:47

## 2019-12-29 RX ADMIN — Medication 975 MILLIGRAM(S): at 11:09

## 2019-12-29 RX ADMIN — ONDANSETRON 4 MILLIGRAM(S): 8 TABLET, FILM COATED ORAL at 06:49

## 2019-12-29 RX ADMIN — Medication 975 MILLIGRAM(S): at 11:47

## 2019-12-29 NOTE — CONSULT NOTE ADULT - ATTENDING COMMENTS
Reviewed note. Did not examine patient. Management per neurosurgery for  [facility], Patient should follow up with primary ophthalmologist within 1 day of discharge.

## 2019-12-29 NOTE — ED PROVIDER NOTE - PROGRESS NOTE DETAILS
Dr. Sebastian Huddleston, PGY-2: spoke w/ optho resident (requested by neurosurg). They will come eval pt Dr. Sebastian Huddleston, PGY-2: spoke w/ optho resident (requested by neurosurg). They will come eval patient  **ATTENDING ADDENDUM (Dr. Edmond Easton): patient serially evaluated throughout ED course. NO acute deterioration up to this time in the ED. However, patient not improved. Awaiting completion of consultations by ophthalmology and neurosurgery. May require neurology consultation and possible admission for intractable pain. Will continue to observe and monitor closely. **ATTENDING ADDENDUM (Dr. Edmond Easton): patient serially evaluated throughout ED course by ED team. NO acute deterioration up to this time in the ED. Opthalmology present to evaluate patient. Will continue to observe and monitor closely. **ATTENDING ADDENDUM (Dr. Edmond Easton): patient serially evaluated throughout ED course by ED team. NO acute deterioration up to this time in the ED. Clinical improvement. Recommendations by neurosurgery and ophthalmology reviewed. Agree with Agree with goals/plan of ED care including discharge with close outpatient followup with primary care physician/provider. Anticipatory guidance provided by ED team. Ava Ng MD, PGY2: Patient received at resident sign out. Optho seen the pt, + optic disc swelling but pt has this chronically, no change in vision. Pt feels better, after toradol. tyleno is given, pt generally manages the pain with tylenol, wondering exadorin for migraine, pt has family h/o migraine. Neuro reccs will be given. I have given the pt strict return and follow up precautions. The patient has been provided with a copy of all pertinent results. The patient has been informed of all concerning signs and symptoms to return to Emergency Department, the necessity to follow up with PMD/Clinic/follow up provided within 2-3 days was explained, and the patient reports understanding of above with capacity and insight.

## 2019-12-29 NOTE — CONSULT NOTE ADULT - SUBJECTIVE AND OBJECTIVE BOX
[facility] DEPARTMENT OF OPHTHALMOLOGY - INITIAL ADULT CONSULT  -----------------------------------------------------------------------------------------------------------------  Uma Velarde MD PGY-2  -----------------------------------------------------------------------------------------------------------------    HPI: 33 y/o female with pseudotumor cerebri s/p  shunt presents due to worsened headaches for 3 days. Ophthalmology consulted to assess for papilledema to determine if  shunt functioning as shows possible kink on imaging. Pt reports that her vision is stable, no acute changes, has chronically poor vision particularly OS. She sees an ophthalmologist regularly (Dr. Carlyle Valdez at [facility]) who has known her for a long time. She last saw him about 1 month ago and was told that she continues to have disc edema, but that it is decreased from prior. Denies flashes, floaters, transient visual obscurations, metamorphopsias, field cuts.      PAST MEDICAL & SURGICAL HISTORY:  Cervical spine pain: s/p fusion;  still has pain to b/l shoulders and tingling to her right arm  Patient is [employer]  Headache  Benign intracranial hypertension  S/P : &#x27;  S/P cervical spinal fusion: 2016&#x27;   C3-C4    Past Ocular History: Pt follows w/ ophthalmologist Carlyle Valdez for disc edema OU    FAMILY HISTORY: No one in family with pseudotumor    Social History: Denies smoking    Ophthalmic Medications: none    MEDICATIONS: Not on Diamox    Allergies & Intolerances:     Review of Systems:  Constitutional: No fever, chills  Eyes: No flashes, floaters, FBS, erythema, discharge, double vision OU  Neuro: No tremors, +headache (improved from prior)  Cardiovascular: No chest pain, palpitations  Respiratory: No SOB, no cough  GI: No nausea, vomiting, abdominal pain  : No dysuria  Skin: no rash  Psych: no depression  Endocrine: no polyuria, polydipsia  Heme/lymph: no swelling    VITALS: T(C): 36.9 (19 @ 11:07)  T(F): 98.5 (19 @ 11:07), Max: 98.5 (19 @ 11:07)  HR: 80 (19 @ 11:07) (72 - 80)  BP: 121/77 (19 @ 11:07) (105/65 - 121/77)  RR:  (16 - 18)  SpO2:  (98% - 100%)    Ophthalmology Exam:  Visual acuity (sc): 20/100, CF at 2 ft OS, NI PH  Pupils: PERRL OU, no APD  Ttono: 16 OU  Extraocular movements (EOMs): Full OU, no pain, no diplopia  Confrontational Visual Field (CVF): possible inferior defect OD, difficult to assess OS   Color Plates: 3/12 OU    Pen Light Exam (PLE)  External: Flat OU  Lids/Lashes/Lacrimal Ducts: Flat OU    Sclera/Conjunctiva: W+Q OU  Cornea: Cl OU  Anterior Chamber: D+F OU    Iris: Flat OU  Lens: Cl OU    Fundus Exam: dilated with 1% tropicamide and 2.5% phenylephrine  Approval obtained from primary team for dilation  Patient aware that pupils can remained dilated for at least 4-6 hours  Exam performed with 20D lens    Vitreous: wnl OU  Disc, cup/disc: disc edema OU, no hemorrhages or CWS near the nerves, no hyperemia, discs pale and grey w/ peripapillary gliosis, narrowing of peripapillary retinal vessels OU  Macula: wnl OU  Vessels: wnl OU  Periphery: wnl OU    Labs/Imaging:  No imaging performed.

## 2019-12-29 NOTE — ED PROVIDER NOTE - NS ED ROS FT
GENERAL: No fever or chills  EYES: per HPI  HEENT: No trouble swallowing or speaking  CARDIAC: No chest pain  PULMONARY: No cough or SOB  GI: No abdominal pain, +nausea and vomiting, no diarrhea or constipation  : No changes in urination  SKIN: No rashes  NEURO: +headache, no numbness  MSK: No joint pain  Otherwise as HPI or negative.

## 2019-12-29 NOTE — CONSULT NOTE ADULT - SUBJECTIVE AND OBJECTIVE BOX
Pager: [phone]  CHIEF COMPLAINT/ REASON FOR CONSULTATION:    Headache     HPI:  34F PMH Pseudotumor cerebri s/p stending and placement of ventriculoperitoneal shunt, now presents to the ED with gradually worsening headache over 3 days. She denies visual changes. Nothing makes the headache better or worse. She states the headache is not as bad as it was prior to the shunt placement, but is worse than it has been post op.     PAST MEDICAL HISTORY   Cervical spine pain  Patient is [employer]  Headache  Benign intracranial hypertension    PAST SURGICAL HISTORY   S/P   S/P cervical spinal fusion        MEDICATIONS:  Antibiotics:    Neuro:    Anticoagulation:    Other:      SOCIAL HISTORY:   Occupation:   Marital Status:     FAMILY HISTORY:  No pertinent family history in first degree relatives      REVIEW OF SYSTEMS:  Check here if all are normal other than Neurological []  Negative except as above.     PHYSICAL EXAMINATION:   T(C): 36.9 (19 @ 04:25), Max: 36.9 (19 @ 04:25)  HR: 72 (19 @ 04:25) (72 - 72)  BP: 105/65 (19 @ 04:25) (105/65 - 105/65)  RR: 16 (19 @ 04:25) (16 - 16)  SpO2: 98% (19 @ 04:25) (98% - 98%)  Wt(kg): --Height (cm): 154.94 ( @ 04:25)  Weight (kg): 114.8 ( @ 04:25)    Exam:  Awake, Alert, AOX3  PERRL, EOMI, Face equal, Tongue m/l  5/5 throughout, no drift  SILT  Shunt pumps and refills   LABS:                RADIOLOGY & ADDITIONAL STUDIES:  CTH at [facility]: Decreased vents when compared to december  CXR- possible kinking of distal catheter

## 2019-12-29 NOTE — ED PROVIDER NOTE - CLINICAL SUMMARY MEDICAL DECISION MAKING FREE TEXT BOX
33 y/o F w/ PMH of pseudotumor s/p  shunt presenting w/ headache. Given findings at OSH and complaint of headache, concern for shunt malfunction. Will get neurosurg consult. Likely will need shunt imaging. Pt came w/ images on discs, will get those uploaded. Pt w/ subjective weakness of upper extremities however on exam strength is intact. Labs. Zofran for nausea. EKG to eval QTc given pt received zofran and reglan at OSH. Dispo likely per neurosurg recs. Reassess the need for additional intervention as clinically indicated.

## 2019-12-29 NOTE — ED PROVIDER NOTE - PHYSICAL EXAMINATION
Gen: NAD, AOx3, able to make needs known, non-toxic  Head: NCAT  HEENT: EOMI, oral mucosa moist. With R eye covered, pt unable to see how many fingers being held up at approx 3 feet. W/ L eye covered pt able to see w/o difficulty.  Lung: CTAB, no respiratory distress, no wheezes/rhonchi/rales B/L, speaking in full sentences  CV: RRR, no murmurs  Abd: soft, NTND, no guarding  MSK: no visible deformities  Neuro: No focal sensory or motor deficits. CN 2-12 intact.   Skin: Warm, well perfused  Psych: normal affect

## 2019-12-29 NOTE — ED PROVIDER NOTE - OBJECTIVE STATEMENT
35 y/o F w/ PMH of psuedotumor s/p  shunt presenting w/ headache. Pt transferred from OSH for neurosurg eval. Pt presents w/ family member. Reports for the past few days developed severe headache. Located throughout entire head, worse behind eyes. Pain is worse w/ bending forward. Reports this pain feels similar to the symptoms she was having prior to shunt being placed. Additionally has been having blurry vision for months and lack of vision in her R eye. Also complaining of upper extremity weakness. Denies fevers, chills, dizziness, chest pain, cough, shortness of breath, abdominal pain, n/v/d/c, urinary symptoms, MSK pain, rash.

## 2019-12-29 NOTE — ED ADULT NURSE REASSESSMENT NOTE - NS ED NURSE REASSESS COMMENT FT1
Patient resting comfortably in stretcher and is ready for discharge.  She denies pain or discomfort.

## 2019-12-29 NOTE — ED PROVIDER NOTE - PATIENT PORTAL LINK FT
You can access the FollowMyHealth Patient Portal offered by [facility] by registering at the following website: [email or web address]. By joining [email or web address]’s FollowMyHealth portal, you will also be able to view your health information using other applications (apps) compatible with our system.

## 2019-12-29 NOTE — ED PROVIDER NOTE - NSFOLLOWUPINSTRUCTIONS_ED_ALL_ED_FT
Thank you for visiting our Emergency Department, it has been a pleasure taking part in your healthcare.    You had a thorough evaluation including an exam, labs and imaging. You were given medications for comfort. Your workup did not demonstrate any concerning findings. This does not mean that your pain is not real, only that we were unable to find a dangerous or life-threatening cause. Please read the attached information sheets as they will provide useful information regarding your condition.    Your discharge diagnosis is: headache  Return precautions to the Emergency Department include but are not limited to: unrelenting nausea, vomiting, fever, chest pain, shortness of breath, chest or abdominal pain, worsening back pain, syncope, blood in urine or stool, headache that doesn't resolve, numbness or tingling, loss of sensation, loss of motor function, or any other concerning symptoms.    1) Follow up with your primary care doctor within 48 hours. Please call [phone] to make an appointment or with any questions you may have.  or call [phone] to make an appointment with the clinic  2) You should also establish care with Neurology  to find a doctor affiliated with [facility] in your neighborhood & network. Please bring your labs and imaging with you to your appointment, as needed.  3) Take Tylenol 650-1000mg every six hours and supplement with ibuprofen 400-600mg, with food, every six hours which can be taken three hours apart from the Tylenol to have a layered effect. Please do not take these medications if you do no have pain or if you have any history of bleeding disorders, kidney or liver disease. Do not use ibuprofen if you are on blood thinners (anti-coagulation).  4) Drink at least 2 Liters or 64 Ounces of water each day.

## 2019-12-29 NOTE — ED PROVIDER NOTE - NSFOLLOWUPCLINICS_GEN_ALL_ED_FT
[facility]  Neurology Autoimmune Encephalitis  [address] [place] [address]  Phone: [phone]  Fax: [phone]  Follow Up Time: Routine    [facility]  Neurology  [address] [place] [address] [address] [place] [address]  Phone: [phone]  Fax:   Follow Up Time: Routine

## 2019-12-29 NOTE — ED ADULT NURSE REASSESSMENT NOTE - NS ED NURSE REASSESS COMMENT FT1
Pt states improvement in headache from Tylenol and Toradol. Pt offered food and provided ginger ale. Aware of plan of care, awaiting neurosurgery team and x-ray results.

## 2019-12-29 NOTE — CHART NOTE - NSCHARTNOTEFT_GEN_A_CORE
Patient re evaluated and imaging reviewed. No kinks in the shunt. Patient's headache has resolved.  - No acute neurosurgical intervention  -Patient may follow up outpatient with neurosurgery and with a migraine specialist  -optho saw and noted chronic disc edema.

## 2019-12-29 NOTE — CONSULT NOTE ADULT - ASSESSMENT
34F with h/o pseudotumor, worsening headache.   -Repeat Shunt series here   - Optho to see to eval for papilledema  - q. 4 hour neuro checks   - Pain control

## 2019-12-29 NOTE — ED ADULT NURSE REASSESSMENT NOTE - NS ED NURSE REASSESS COMMENT FT1
Report received from MONE Bose. Pt presented to ED with headache for 3-4 days. Pt found sleeping in stretcher, A+Ox3, c/o slight nausea and 4/10 headache left side of face radiating to jaw. VSS, neuro check stable, pt aware of plan of care, awaiting x-ray shunt series, family at bedside.

## 2019-12-29 NOTE — CONSULT NOTE ADULT - ASSESSMENT
1. Disc edema OU  Poor vision OS>OD (as per patient, stable), color plates poor, no APD. Possible CVF defect OD, unable to asses OS. Disc edema OU w/ no hyperemia, but discs pale and grey w/ gliosis, most likely chronic process. Unable to assess if any acute worsening because unable to compare w/ prior. Pt reports that she has had disc swelling at her last ophthalmology appointment. Pt follows closely with Dr. Carlyle Valdez.  - Recommend management of  shunt as per neurosurgery. Several studies have shown that disc edema is not a sensitive sign of shunt failure. Disc edema visualized is likely chronic, but unable to comment on whether there is any acute worsening currently.   - Patient prefers to f/u with her outpatient ophthalmologist Dr. Valdez, who she has been seeing. Recommend follow-up within 1 week of discharge. Pt is welcome to f/u w/ [facility] at [address] as well, if she prefers.   - Ophthalmology will follow if the pt is admitted

## 2019-12-29 NOTE — ED PROVIDER NOTE - CARE PLAN
Goal:	**ATTENDING ADDENDUM (Dr. Edmond Easton): Goals of care include resolution of emergent/urgent symptoms and concerns, and restoration to baseline level of homeostasis. Principal Discharge DX:	Nonintractable headache, unspecified chronicity pattern, unspecified headache type  Goal:	**ATTENDING ADDENDUM (Dr. Edmond Easton): Goals of care include resolution of emergent/urgent symptoms and concerns, and restoration to baseline level of homeostasis.

## 2019-12-29 NOTE — ED ADULT NURSE NOTE - OBJECTIVE STATEMENT
34 yr old female arrived to the ED via EMS transfer from [facility] for neuro/neurosurgery eval. pt has  shunt placed in m,ay and has a severe headache for 3-5 days 34 yr old female arrived to the ED via EMS transfer from [facility] for neuro/neurosurgery eval. pt has  shunt placed in may for pseudomotor cerebri. and has a severe headache for 3-5 days. pt has endorses 10/10 headache with nausea and vomiting 34 yr old female arrived to the ED via EMS transfer from [facility] for neuro/neurosurgery eval. pt has  shunt placed in may for pseudomotor cerebri. and has a severe headache for 3-5 days. pt has endorses 10/10 headache with nausea and vomiting. pt was medication with zofran, reglan  and morphine prior to leaving [facility] and was vomiting upon arrival to [facility] ED. upon assessment pt is a&ox4, lethargic, answering question and following commands. pt is weak in all extremities. 2mm PERRLA noted. speech is clear, no facial asymmetry noted. lungs clear meena. respirations are even and unlabored. abd is soft, non tender. pt denies blurry vision, fever, chills, chest pain or sob

## 2019-12-29 NOTE — ED PROVIDER NOTE - SHIFT CHANGE DETAILS
***ATTENDING ADDENDUM (Dr. Edmond Easton): I have received handoff from [facility]; followup ophthalmology and neurosurgical evaluation (patient with history of pseudotumor cerebri, with shunt - possible shunt malfunction), with headache. ***ATTENDING ADDENDUM (Dr. Edmond Easton): I have received handoff from [facility] [place] [facility]; followup ophthalmology and neurosurgical evaluation (patient with history of pseudotumor cerebri, with shunt - possible shunt malfunction), with headache. may require neurology consultation. Will continue to observe and monitor closely.

## 2019-12-29 NOTE — ED PROVIDER NOTE - ATTENDING CONTRIBUTION TO CARE
MD Matthew:  patient seen and evaluated personally.   I agree with the History & Physical,  Impression & Plan other than what was detailed in my note.  MD Matthew    33 y/o f hx of pseudotumor cerebri,  shunt, presents to ed from osh for NS eval. pt had ct done there and  shunt series questionable kink in shunt?. pt reporting headache, chronic blurry vision, no vision loss. also reporting b/l upper extrem weakness. no f/c, neck pain, no unilateral weakness. afebrile vitals stable, non toxic, power 5/5 x 4, no weakness in upper extrem, cn 2-xii gi, cerebellar function intact. discussed case w/ ns who will evaluate pt

## 2019-12-29 NOTE — ED ADULT NURSE NOTE - CHIEF COMPLAINT QUOTE
transfer from [facility] for headache,  shunt and neurology/neurosx eval

## 2019-12-29 NOTE — ED PROVIDER NOTE - PMH
Benign intracranial hypertension    Cervical spine pain  s/p fusion;  still has pain to b/l shoulders and tingling to her right arm  Headache    Patient is Islam

## 2019-12-31 ENCOUNTER — APPOINTMENT (OUTPATIENT)
Dept: SPINE | Facility: CLINIC | Age: 34
End: 2019-12-31

## 2020-03-06 ENCOUNTER — APPOINTMENT (OUTPATIENT)
Dept: NEUROLOGY | Facility: CLINIC | Age: 35
End: 2020-03-06

## 2020-04-17 ENCOUNTER — APPOINTMENT (OUTPATIENT)
Dept: NEUROLOGY | Facility: CLINIC | Age: 35
End: 2020-04-17

## 2020-12-04 ENCOUNTER — NON-APPOINTMENT (OUTPATIENT)
Age: 35
End: 2020-12-04

## 2021-02-17 ENCOUNTER — APPOINTMENT (OUTPATIENT)
Dept: NEUROLOGY | Facility: CLINIC | Age: 36
End: 2021-02-17
Payer: MEDICARE

## 2021-02-17 VITALS — TEMPERATURE: 98 F

## 2021-02-17 DIAGNOSIS — G43.119 MIGRAINE WITH AURA, INTRACTABLE, W/OUT STATUS MIGRAINOSUS: ICD-10-CM

## 2021-02-17 PROCEDURE — 99072 ADDL SUPL MATRL&STAF TM PHE: CPT

## 2021-02-17 PROCEDURE — 99203 OFFICE O/P NEW LOW 30 MIN: CPT

## 2021-02-17 NOTE — DISCUSSION/SUMMARY
[FreeTextEntry1] : Reviewed previous MRI and CT. No contraindications to treatment of migraine; Recommend preventive treatment using topiramate. Discussed side effects of weight loss tingling and renal stones. Provided a slowly increasing dose to start topiramate and demonstrated using migraine suni application for keeping a record. Discussed use of rizatriptan in place of ibuprofen for abortive treatment.

## 2021-02-17 NOTE — PHYSICAL EXAM
[General Appearance - Alert] : alert [General Appearance - In No Acute Distress] : in no acute distress [General Appearance - Well Nourished] : well nourished [Oriented To Time, Place, And Person] : oriented to person, place, and time [Impaired Insight] : insight and judgment were intact [Affect] : the affect was normal [Person] : oriented to person [Place] : oriented to place [Time] : oriented to time [Short Term Intact] : short term memory intact [Remote Intact] : remote memory intact [Registration Intact] : recent registration memory intact [Span Intact] : the attention span was normal [Concentration Intact] : normal concentrating ability [Visual Intact] : visual attention was ~T not ~L decreased [Naming Objects] : no difficulty naming common objects [Writing A Sentence] : no difficulty writing a sentence [Repeating Phrases] : no difficulty repeating a phrase [Fluency] : fluency intact [Comprehension] : comprehension intact [Reading] : reading intact [Cranial Nerves Optic (II)] : visual acuity intact bilaterally,  visual fields full to confrontation, pupils equal round and reactive to light [Cranial Nerves Oculomotor (III)] : extraocular motion intact [Cranial Nerves Trigeminal (V)] : facial sensation intact symmetrically [Cranial Nerves Vestibulocochlear (VIII)] : hearing was intact bilaterally [Cranial Nerves Facial (VII)] : face symmetrical [Cranial Nerves Glossopharyngeal (IX)] : tongue and palate midline [Cranial Nerves Accessory (XI - Cranial And Spinal)] : head turning and shoulder shrug symmetric [Cranial Nerves Hypoglossal (XII)] : there was no tongue deviation with protrusion [Motor Strength] : muscle strength was normal in all four extremities [Motor Tone] : muscle tone was normal in all four extremities [Motor Handedness Right-Handed] : the patient is right hand dominant [Motor Strength Upper Extremities Bilaterally] : strength was normal in both upper extremities [Sensation Tactile Decrease] : light touch was intact [Motor Strength Lower Extremities Bilaterally] : strength was normal in both lower extremities [Sensation Pain / Temperature Decrease] : pain and temperature was intact [Sensation Vibration Decrease] : vibration was intact [Romberg's Sign] : Romberg's sign was negtive [2+] : Ankle jerk left 2+ [Plantar Reflex Right Only] : normal on the right [Plantar Reflex Left Only] : normal on the left

## 2021-02-17 NOTE — REVIEW OF SYSTEMS
[Seizures] : no convulsions [Dizziness] : dizziness [Fainting] : no fainting [Lightheadedness] : lightheadedness [Vertigo] : no vertigo [Migraine Headache] : migraine headaches [Difficulty Walking] : no difficulty walking [Inability to Walk] : able to walk [Ataxia] : no ataxia [Frequent Falls] : not falling [Limping] : not limping [Eyesight Problems] : eyesight problems [Negative] : Heme/Lymph

## 2021-02-17 NOTE — HISTORY OF PRESENT ILLNESS
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 Although this improved her headaches, blurred vision persisted. The spinal fluid opening pressure post stent reportedly dropped from 33 cm to 29 cm and  the although her papilledema had improved, because of continuing blurred vision she had a right parietal ventriculoperitoneal shunt with a programmable valve and the siphonGuardcset to 4.0.\par \par Although the headache and the blurred vision have improved significantly, she does get 2 or 3 headaches in a week that amount to about 10-12 headaches per month. One of the headache types consists of severe bilateral throbbing upon getting up from bed in the morning. This doesn't occur on every occasion that she gets up from bed in the morning, and she cannot remember any other associated events that might be considered triggers when she does get this type of headache; the other type of headache is an aching pain in the back of the head that is more frequent and occurs randomly, for example, occurring today as she walked into the visiting for this appointment.

## 2021-03-12 ENCOUNTER — APPOINTMENT (OUTPATIENT)
Dept: SURGERY | Facility: CLINIC | Age: 36
End: 2021-03-12
Payer: MEDICARE

## 2021-03-12 VITALS
TEMPERATURE: 98 F | SYSTOLIC BLOOD PRESSURE: 138 MMHG | RESPIRATION RATE: 16 BRPM | HEART RATE: 79 BPM | BODY MASS INDEX: 53.81 KG/M2 | WEIGHT: 285 LBS | DIASTOLIC BLOOD PRESSURE: 73 MMHG | HEIGHT: 61 IN | OXYGEN SATURATION: 98 %

## 2021-03-12 DIAGNOSIS — Z01.818 ENCOUNTER FOR OTHER PREPROCEDURAL EXAMINATION: ICD-10-CM

## 2021-03-12 DIAGNOSIS — E66.01 MORBID (SEVERE) OBESITY DUE TO EXCESS CALORIES: ICD-10-CM

## 2021-03-12 DIAGNOSIS — Z87.39 PERSONAL HISTORY OF OTHER DISEASES OF THE MUSCULOSKELETAL SYSTEM AND CONNECTIVE TISSUE: ICD-10-CM

## 2021-03-12 PROCEDURE — 99205 OFFICE O/P NEW HI 60 MIN: CPT

## 2021-03-12 PROCEDURE — 99072 ADDL SUPL MATRL&STAF TM PHE: CPT

## 2021-03-12 RX ORDER — MAGNESIUM OXIDE 241.3 MG/1000MG
400 TABLET ORAL DAILY
Qty: 100 | Refills: 3 | Status: DISCONTINUED | COMMUNITY
Start: 2021-02-17 | End: 2021-03-12

## 2021-03-12 RX ORDER — RIZATRIPTAN BENZOATE 10 MG/1
10 TABLET ORAL
Qty: 1 | Refills: 5 | Status: DISCONTINUED | COMMUNITY
Start: 2021-02-17 | End: 2021-03-12

## 2021-03-12 RX ORDER — ACETAMINOPHEN 500 MG/1
TABLET, COATED ORAL
Refills: 0 | Status: DISCONTINUED | COMMUNITY
End: 2021-03-12

## 2021-03-12 RX ORDER — TOPIRAMATE 25 MG/1
25 TABLET, FILM COATED ORAL TWICE DAILY
Qty: 120 | Refills: 3 | Status: DISCONTINUED | COMMUNITY
Start: 2021-02-17 | End: 2021-03-12

## 2021-03-12 RX ORDER — RIBOFLAVIN (VITAMIN B2) 400 MG
400 TABLET ORAL
Qty: 100 | Refills: 3 | Status: DISCONTINUED | COMMUNITY
Start: 2021-02-17 | End: 2021-03-12

## 2021-03-12 NOTE — HISTORY OF PRESENT ILLNESS
[de-identified] : Sima is a 36 year old female here for consultation for weight loss surgery 36-year-old female 5 foot 1 285 pounds with a BMI of 53.9 she has been heavy most of her adult life she has been on multiple diet programs in the past losing up to 30 pounds at any one time always gaining that weight back she is looking for a more permanent solution to her weight loss problem she is interested in a sleeve gastrectomy she has a history of pseudotumor cerebri she is a Amish and she has a  shunt

## 2021-03-12 NOTE — ASSESSMENT
[FreeTextEntry1] : 36-year-old female 5 foot 1 285 pounds with a BMI of 53.9 she would like to be considered for a sleeve gastrectomy I believe she would be an excellent candidate.  She has a history of pseudotumor cerebri she underwent a  shunt in 2018.  We have had a long discussion regarding the risks benefits and expectations of the procedure including options she would like to proceed with the work-up she will undergo her usual medical nutritional and psychological work-up attend a preoperative support group meeting and return for second office visit once all these are completed all of her questions were answered

## 2021-03-31 ENCOUNTER — APPOINTMENT (OUTPATIENT)
Dept: NEUROLOGY | Facility: CLINIC | Age: 36
End: 2021-03-31

## 2021-05-28 ENCOUNTER — APPOINTMENT (OUTPATIENT)
Dept: SPINE | Facility: CLINIC | Age: 36
End: 2021-05-28

## 2021-06-14 ENCOUNTER — APPOINTMENT (OUTPATIENT)
Dept: SPINE | Facility: CLINIC | Age: 36
End: 2021-06-14
Payer: MEDICARE

## 2021-06-14 VITALS
HEIGHT: 61 IN | BODY MASS INDEX: 53.81 KG/M2 | RESPIRATION RATE: 18 BRPM | WEIGHT: 285 LBS | HEART RATE: 79 BPM | SYSTOLIC BLOOD PRESSURE: 137 MMHG | OXYGEN SATURATION: 96 % | DIASTOLIC BLOOD PRESSURE: 82 MMHG | TEMPERATURE: 98 F

## 2021-06-14 DIAGNOSIS — G93.2 BENIGN INTRACRANIAL HYPERTENSION: ICD-10-CM

## 2021-06-14 PROCEDURE — 99212 OFFICE O/P EST SF 10 MIN: CPT

## 2021-06-14 PROCEDURE — 99072 ADDL SUPL MATRL&STAF TM PHE: CPT

## 2021-06-14 RX ORDER — ACETAMINOPHEN, ASPIRIN, AND CAFFEINE 250; 250; 65 MG/1; MG/1; MG/1
TABLET, FILM COATED ORAL
Refills: 0 | Status: ACTIVE | COMMUNITY

## 2021-07-15 NOTE — PHYSICAL EXAM
[General Appearance - Alert] : alert [General Appearance - In No Acute Distress] : in no acute distress [General Appearance - Well Nourished] : well nourished [Oriented To Time, Place, And Person] : oriented to person, place, and time [Impaired Insight] : insight and judgment were intact [Affect] : the affect was normal [Person] : oriented to person [Place] : oriented to place [Short Term Intact] : short term memory intact [Time] : oriented to time [Remote Intact] : remote memory intact [Registration Intact] : recent registration memory intact [Span Intact] : the attention span was normal [Concentration Intact] : normal concentrating ability [Visual Intact] : visual attention was ~T not ~L decreased [Naming Objects] : no difficulty naming common objects [Writing A Sentence] : no difficulty writing a sentence [Repeating Phrases] : no difficulty repeating a phrase [Fluency] : fluency intact 2 [Comprehension] : comprehension intact [Reading] : reading intact [Cranial Nerves Optic (II)] : visual acuity intact bilaterally,  visual fields full to confrontation, pupils equal round and reactive to light [Cranial Nerves Oculomotor (III)] : extraocular motion intact [Cranial Nerves Trigeminal (V)] : facial sensation intact symmetrically [Cranial Nerves Facial (VII)] : face symmetrical [Cranial Nerves Vestibulocochlear (VIII)] : hearing was intact bilaterally [Cranial Nerves Glossopharyngeal (IX)] : tongue and palate midline [Cranial Nerves Accessory (XI - Cranial And Spinal)] : head turning and shoulder shrug symmetric [Cranial Nerves Hypoglossal (XII)] : there was no tongue deviation with protrusion [Motor Strength] : muscle strength was normal in all four extremities [Motor Tone] : muscle tone was normal in all four extremities [Motor Handedness Right-Handed] : the patient is right hand dominant [Motor Strength Upper Extremities Bilaterally] : strength was normal in both upper extremities [Motor Strength Lower Extremities Bilaterally] : strength was normal in both lower extremities [Sensation Tactile Decrease] : light touch was intact [Sensation Pain / Temperature Decrease] : pain and temperature was intact [Sensation Vibration Decrease] : vibration was intact [Romberg's Sign] : Romberg's sign was negtive [2+] : Ankle jerk left 2+ [Plantar Reflex Right Only] : normal on the right [Plantar Reflex Left Only] : normal on the left

## 2022-07-27 NOTE — PATIENT PROFILE ADULT - HARM RISK FACTORS
Tori is a 46 year old who is being evaluated via a billable video visit.      How would you like to obtain your AVS? MyChart    HPI         Review of Systems     Objective    Vitals - Patient Reported  Pain Score: No Pain (0)        Physical Exam     CHARLI Mena LPN      .  ..     no
